# Patient Record
Sex: FEMALE | Race: WHITE | Employment: OTHER | URBAN - METROPOLITAN AREA
[De-identification: names, ages, dates, MRNs, and addresses within clinical notes are randomized per-mention and may not be internally consistent; named-entity substitution may affect disease eponyms.]

---

## 2017-06-08 PROBLEM — R29.898 WEAKNESS OF RIGHT ARM: Status: ACTIVE | Noted: 2017-06-08

## 2017-06-08 PROBLEM — M79.601 PAIN, ARM, RIGHT: Status: ACTIVE | Noted: 2017-06-08

## 2017-06-08 PROBLEM — L81.9 DISCOLORATION OF SKIN OF HAND: Status: ACTIVE | Noted: 2017-06-08

## 2018-03-11 ENCOUNTER — HOSPITAL ENCOUNTER (EMERGENCY)
Age: 43
Discharge: HOME | End: 2018-03-11
Attending: FAMILY MEDICINE
Payer: MEDICAID

## 2018-03-11 VITALS
BODY MASS INDEX: 20.72 KG/M2 | OXYGEN SATURATION: 98 % | RESPIRATION RATE: 18 BRPM | HEART RATE: 79 BPM | WEIGHT: 132 LBS | HEIGHT: 67 IN | TEMPERATURE: 98.2 F | DIASTOLIC BLOOD PRESSURE: 69 MMHG | SYSTOLIC BLOOD PRESSURE: 111 MMHG

## 2018-03-11 DIAGNOSIS — K52.9 GASTROENTERITIS: Primary | ICD-10-CM

## 2018-03-11 PROCEDURE — 99283 EMERGENCY DEPT VISIT LOW MDM: CPT

## 2018-03-11 PROCEDURE — 6360000002 HC RX W HCPCS: Performed by: FAMILY MEDICINE

## 2018-03-11 RX ORDER — ONDANSETRON 4 MG/1
4 TABLET, ORALLY DISINTEGRATING ORAL ONCE
Status: COMPLETED | OUTPATIENT
Start: 2018-03-11 | End: 2018-03-11

## 2018-03-11 RX ORDER — ONDANSETRON 4 MG/1
4 TABLET, ORALLY DISINTEGRATING ORAL EVERY 8 HOURS PRN
Qty: 5 TABLET | Refills: 0 | Status: SHIPPED | OUTPATIENT
Start: 2018-03-11 | End: 2018-04-11 | Stop reason: SDUPTHER

## 2018-03-11 RX ADMIN — ONDANSETRON 4 MG: 4 TABLET, ORALLY DISINTEGRATING ORAL at 13:18

## 2018-03-11 ASSESSMENT — PAIN DESCRIPTION - DESCRIPTORS: DESCRIPTORS: ACHING

## 2018-04-11 ENCOUNTER — HOSPITAL ENCOUNTER (EMERGENCY)
Age: 43
Discharge: HOME | End: 2018-04-11
Attending: EMERGENCY MEDICINE
Payer: MEDICAID

## 2018-04-11 ENCOUNTER — APPOINTMENT (OUTPATIENT)
Dept: GENERAL RADIOLOGY | Age: 43
End: 2018-04-11
Payer: MEDICAID

## 2018-04-11 VITALS
RESPIRATION RATE: 14 BRPM | HEIGHT: 67 IN | DIASTOLIC BLOOD PRESSURE: 72 MMHG | BODY MASS INDEX: 20.56 KG/M2 | HEART RATE: 70 BPM | TEMPERATURE: 98.4 F | WEIGHT: 131 LBS | SYSTOLIC BLOOD PRESSURE: 112 MMHG | OXYGEN SATURATION: 100 %

## 2018-04-11 DIAGNOSIS — R07.9 CHEST PAIN, UNSPECIFIED TYPE: ICD-10-CM

## 2018-04-11 DIAGNOSIS — R19.7 NAUSEA VOMITING AND DIARRHEA: Primary | ICD-10-CM

## 2018-04-11 DIAGNOSIS — R11.2 NAUSEA VOMITING AND DIARRHEA: Primary | ICD-10-CM

## 2018-04-11 LAB
ALBUMIN SERPL-MCNC: 4.7 G/DL (ref 3.5–5.2)
ALP BLD-CCNC: 50 U/L (ref 35–104)
ALT SERPL-CCNC: 9 U/L (ref 0–32)
ANION GAP SERPL CALCULATED.3IONS-SCNC: 16 MMOL/L (ref 7–16)
AST SERPL-CCNC: 14 U/L (ref 0–31)
BACTERIA: ABNORMAL /HPF
BASOPHILS ABSOLUTE: 0.03 E9/L (ref 0–0.2)
BASOPHILS RELATIVE PERCENT: 0.4 % (ref 0–2)
BILIRUB SERPL-MCNC: 0.5 MG/DL (ref 0–1.2)
BILIRUBIN URINE: NEGATIVE
BLOOD, URINE: ABNORMAL
BUN BLDV-MCNC: 12 MG/DL (ref 6–20)
CALCIUM SERPL-MCNC: 9.7 MG/DL (ref 8.6–10.2)
CHLORIDE BLD-SCNC: 100 MMOL/L (ref 98–107)
CLARITY: CLEAR
CO2: 24 MMOL/L (ref 22–29)
COLOR: YELLOW
CREAT SERPL-MCNC: 0.7 MG/DL (ref 0.5–1)
EKG ATRIAL RATE: 76 BPM
EKG P AXIS: 66 DEGREES
EKG P-R INTERVAL: 142 MS
EKG Q-T INTERVAL: 366 MS
EKG QRS DURATION: 72 MS
EKG QTC CALCULATION (BAZETT): 411 MS
EKG R AXIS: 67 DEGREES
EKG T AXIS: 52 DEGREES
EKG VENTRICULAR RATE: 76 BPM
EOSINOPHILS ABSOLUTE: 0.02 E9/L (ref 0.05–0.5)
EOSINOPHILS RELATIVE PERCENT: 0.3 % (ref 0–6)
EPITHELIAL CELLS, UA: ABNORMAL /HPF
GFR AFRICAN AMERICAN: >60
GFR NON-AFRICAN AMERICAN: >60 ML/MIN/1.73
GLUCOSE BLD-MCNC: 106 MG/DL (ref 74–109)
GLUCOSE URINE: NEGATIVE MG/DL
HCT VFR BLD CALC: 42.3 % (ref 34–48)
HEMOGLOBIN: 14.4 G/DL (ref 11.5–15.5)
IMMATURE GRANULOCYTES #: 0.05 E9/L
IMMATURE GRANULOCYTES %: 0.6 % (ref 0–5)
KETONES, URINE: NEGATIVE MG/DL
LACTIC ACID: 0.8 MMOL/L (ref 0.5–2.2)
LEUKOCYTE ESTERASE, URINE: NEGATIVE
LIPASE: 23 U/L (ref 13–60)
LYMPHOCYTES ABSOLUTE: 1.1 E9/L (ref 1.5–4)
LYMPHOCYTES RELATIVE PERCENT: 14.3 % (ref 20–42)
MCH RBC QN AUTO: 30.8 PG (ref 26–35)
MCHC RBC AUTO-ENTMCNC: 34 % (ref 32–34.5)
MCV RBC AUTO: 90.6 FL (ref 80–99.9)
MONOCYTES ABSOLUTE: 0.51 E9/L (ref 0.1–0.95)
MONOCYTES RELATIVE PERCENT: 6.6 % (ref 2–12)
NEUTROPHILS ABSOLUTE: 6 E9/L (ref 1.8–7.3)
NEUTROPHILS RELATIVE PERCENT: 77.8 % (ref 43–80)
NITRITE, URINE: NEGATIVE
PDW BLD-RTO: 12.3 FL (ref 11.5–15)
PH UA: 6 (ref 5–9)
PLATELET # BLD: 245 E9/L (ref 130–450)
PMV BLD AUTO: 10.2 FL (ref 7–12)
POTASSIUM SERPL-SCNC: 3.6 MMOL/L (ref 3.5–5)
PREGNANCY TEST URINE, POC: NEGATIVE
PROTEIN UA: NEGATIVE MG/DL
RBC # BLD: 4.67 E12/L (ref 3.5–5.5)
RBC UA: ABNORMAL /HPF (ref 0–2)
SODIUM BLD-SCNC: 140 MMOL/L (ref 132–146)
SPECIFIC GRAVITY UA: 1.01 (ref 1–1.03)
TOTAL PROTEIN: 7.5 G/DL (ref 6.4–8.3)
TROPONIN: <0.01 NG/ML (ref 0–0.03)
UROBILINOGEN, URINE: 0.2 E.U./DL
WBC # BLD: 7.7 E9/L (ref 4.5–11.5)
WBC UA: ABNORMAL /HPF (ref 0–5)

## 2018-04-11 PROCEDURE — 2580000003 HC RX 258: Performed by: NURSE PRACTITIONER

## 2018-04-11 PROCEDURE — 81001 URINALYSIS AUTO W/SCOPE: CPT

## 2018-04-11 PROCEDURE — 80053 COMPREHEN METABOLIC PANEL: CPT

## 2018-04-11 PROCEDURE — 84484 ASSAY OF TROPONIN QUANT: CPT

## 2018-04-11 PROCEDURE — 96374 THER/PROPH/DIAG INJ IV PUSH: CPT

## 2018-04-11 PROCEDURE — 6370000000 HC RX 637 (ALT 250 FOR IP): Performed by: NURSE PRACTITIONER

## 2018-04-11 PROCEDURE — 74022 RADEX COMPL AQT ABD SERIES: CPT

## 2018-04-11 PROCEDURE — 83690 ASSAY OF LIPASE: CPT

## 2018-04-11 PROCEDURE — 6360000002 HC RX W HCPCS: Performed by: NURSE PRACTITIONER

## 2018-04-11 PROCEDURE — 36415 COLL VENOUS BLD VENIPUNCTURE: CPT

## 2018-04-11 PROCEDURE — 93005 ELECTROCARDIOGRAM TRACING: CPT | Performed by: NURSE PRACTITIONER

## 2018-04-11 PROCEDURE — 85025 COMPLETE CBC W/AUTO DIFF WBC: CPT

## 2018-04-11 PROCEDURE — 83605 ASSAY OF LACTIC ACID: CPT

## 2018-04-11 PROCEDURE — 99284 EMERGENCY DEPT VISIT MOD MDM: CPT

## 2018-04-11 RX ORDER — ONDANSETRON 4 MG/1
4 TABLET, ORALLY DISINTEGRATING ORAL EVERY 8 HOURS PRN
Qty: 10 TABLET | Refills: 0 | Status: SHIPPED | OUTPATIENT
Start: 2018-04-11 | End: 2018-05-15

## 2018-04-11 RX ORDER — 0.9 % SODIUM CHLORIDE 0.9 %
1000 INTRAVENOUS SOLUTION INTRAVENOUS ONCE
Status: COMPLETED | OUTPATIENT
Start: 2018-04-11 | End: 2018-04-11

## 2018-04-11 RX ORDER — ONDANSETRON 2 MG/ML
4 INJECTION INTRAMUSCULAR; INTRAVENOUS ONCE
Status: COMPLETED | OUTPATIENT
Start: 2018-04-11 | End: 2018-04-11

## 2018-04-11 RX ADMIN — SODIUM CHLORIDE 1000 ML: 9 INJECTION, SOLUTION INTRAVENOUS at 10:52

## 2018-04-11 RX ADMIN — ONDANSETRON 4 MG: 2 INJECTION INTRAMUSCULAR; INTRAVENOUS at 11:02

## 2018-04-11 RX ADMIN — Medication 30 ML: at 11:02

## 2018-04-11 ASSESSMENT — PAIN DESCRIPTION - PAIN TYPE: TYPE: ACUTE PAIN

## 2018-04-11 ASSESSMENT — PAIN DESCRIPTION - LOCATION: LOCATION: CHEST

## 2018-04-11 ASSESSMENT — PAIN DESCRIPTION - FREQUENCY: FREQUENCY: INTERMITTENT

## 2018-04-11 ASSESSMENT — PAIN DESCRIPTION - DESCRIPTORS: DESCRIPTORS: SHARP

## 2018-04-11 ASSESSMENT — PAIN SCALES - GENERAL: PAINLEVEL_OUTOF10: 4

## 2018-04-28 ENCOUNTER — HOSPITAL ENCOUNTER (EMERGENCY)
Age: 43
Discharge: HOME | End: 2018-04-28
Attending: EMERGENCY MEDICINE
Payer: MEDICAID

## 2018-04-28 VITALS
SYSTOLIC BLOOD PRESSURE: 142 MMHG | WEIGHT: 128 LBS | TEMPERATURE: 98.8 F | HEIGHT: 67 IN | HEART RATE: 102 BPM | BODY MASS INDEX: 20.09 KG/M2 | RESPIRATION RATE: 16 BRPM | DIASTOLIC BLOOD PRESSURE: 74 MMHG | OXYGEN SATURATION: 99 %

## 2018-04-28 DIAGNOSIS — R20.2 PARESTHESIA OF BOTH LEGS: Primary | ICD-10-CM

## 2018-04-28 PROCEDURE — 99283 EMERGENCY DEPT VISIT LOW MDM: CPT

## 2018-04-28 RX ORDER — VENLAFAXINE 75 MG/1
75 TABLET ORAL 3 TIMES DAILY
COMMUNITY
End: 2018-05-15

## 2018-04-28 RX ORDER — OMEPRAZOLE 20 MG/1
20 CAPSULE, DELAYED RELEASE ORAL DAILY
COMMUNITY
End: 2018-05-15

## 2018-04-28 ASSESSMENT — ENCOUNTER SYMPTOMS
VOMITING: 0
WHEEZING: 0
SORE THROAT: 0
SHORTNESS OF BREATH: 0
DIARRHEA: 0
ABDOMINAL DISTENTION: 0
COUGH: 0
SINUS PRESSURE: 0
ABDOMINAL PAIN: 0
CHEST TIGHTNESS: 0
NAUSEA: 0
BACK PAIN: 0

## 2018-04-28 ASSESSMENT — PAIN SCALES - GENERAL: PAINLEVEL_OUTOF10: 9

## 2018-05-05 ENCOUNTER — HOSPITAL ENCOUNTER (EMERGENCY)
Age: 43
Discharge: HOME | End: 2018-05-05
Attending: FAMILY MEDICINE
Payer: MEDICAID

## 2018-05-05 VITALS
DIASTOLIC BLOOD PRESSURE: 76 MMHG | TEMPERATURE: 98.6 F | OXYGEN SATURATION: 100 % | HEART RATE: 80 BPM | RESPIRATION RATE: 18 BRPM | SYSTOLIC BLOOD PRESSURE: 133 MMHG

## 2018-05-05 DIAGNOSIS — N30.01 ACUTE CYSTITIS WITH HEMATURIA: Primary | ICD-10-CM

## 2018-05-05 LAB
BACTERIA: ABNORMAL /HPF
BILIRUBIN URINE: NEGATIVE
BLOOD, URINE: ABNORMAL
CLARITY: CLEAR
COLOR: ABNORMAL
EPITHELIAL CELLS, UA: ABNORMAL /HPF
GLUCOSE URINE: NEGATIVE MG/DL
KETONES, URINE: NEGATIVE MG/DL
LEUKOCYTE ESTERASE, URINE: NEGATIVE
NITRITE, URINE: NEGATIVE
PH UA: 7 (ref 5–9)
PROTEIN UA: NEGATIVE MG/DL
RBC UA: ABNORMAL /HPF (ref 0–2)
SPECIFIC GRAVITY UA: 1.01 (ref 1–1.03)
UROBILINOGEN, URINE: 0.2 E.U./DL
WBC UA: ABNORMAL /HPF (ref 0–5)

## 2018-05-05 PROCEDURE — 87186 SC STD MICRODIL/AGAR DIL: CPT

## 2018-05-05 PROCEDURE — 87077 CULTURE AEROBIC IDENTIFY: CPT

## 2018-05-05 PROCEDURE — 99283 EMERGENCY DEPT VISIT LOW MDM: CPT

## 2018-05-05 PROCEDURE — 81001 URINALYSIS AUTO W/SCOPE: CPT

## 2018-05-05 PROCEDURE — 87088 URINE BACTERIA CULTURE: CPT

## 2018-05-05 RX ORDER — NITROFURANTOIN 25; 75 MG/1; MG/1
100 CAPSULE ORAL 2 TIMES DAILY
Qty: 10 CAPSULE | Refills: 0 | Status: SHIPPED | OUTPATIENT
Start: 2018-05-05 | End: 2018-05-10 | Stop reason: HOSPADM

## 2018-05-05 ASSESSMENT — ENCOUNTER SYMPTOMS
VOMITING: 0
NAUSEA: 1

## 2018-05-05 ASSESSMENT — PAIN DESCRIPTION - ORIENTATION: ORIENTATION: LOWER

## 2018-05-05 ASSESSMENT — PAIN DESCRIPTION - PAIN TYPE: TYPE: ACUTE PAIN

## 2018-05-05 ASSESSMENT — PAIN SCALES - GENERAL: PAINLEVEL_OUTOF10: 7

## 2018-05-05 ASSESSMENT — PAIN DESCRIPTION - LOCATION: LOCATION: ABDOMEN

## 2018-05-07 LAB
ORGANISM: ABNORMAL
URINE CULTURE, ROUTINE: ABNORMAL
URINE CULTURE, ROUTINE: ABNORMAL

## 2018-05-10 ENCOUNTER — HOSPITAL ENCOUNTER (EMERGENCY)
Age: 43
Discharge: HOME | End: 2018-05-10
Attending: EMERGENCY MEDICINE
Payer: MEDICAID

## 2018-05-10 VITALS
HEIGHT: 67 IN | TEMPERATURE: 98.3 F | WEIGHT: 123 LBS | HEART RATE: 93 BPM | DIASTOLIC BLOOD PRESSURE: 76 MMHG | SYSTOLIC BLOOD PRESSURE: 105 MMHG | OXYGEN SATURATION: 97 % | BODY MASS INDEX: 19.3 KG/M2 | RESPIRATION RATE: 16 BRPM

## 2018-05-10 DIAGNOSIS — R07.9 CHEST PAIN, UNSPECIFIED TYPE: Primary | ICD-10-CM

## 2018-05-10 LAB
ALBUMIN SERPL-MCNC: 4.2 G/DL (ref 3.5–5.2)
ALP BLD-CCNC: 41 U/L (ref 35–104)
ALT SERPL-CCNC: 10 U/L (ref 0–32)
ANION GAP SERPL CALCULATED.3IONS-SCNC: 13 MMOL/L (ref 7–16)
AST SERPL-CCNC: 26 U/L (ref 0–31)
BACTERIA: ABNORMAL /HPF
BASOPHILS ABSOLUTE: 0 E9/L (ref 0–0.2)
BASOPHILS RELATIVE PERCENT: 0.5 % (ref 0–2)
BILIRUB SERPL-MCNC: 0.7 MG/DL (ref 0–1.2)
BILIRUBIN URINE: NEGATIVE
BLOOD, URINE: ABNORMAL
BUN BLDV-MCNC: 16 MG/DL (ref 6–20)
CALCIUM SERPL-MCNC: 9.6 MG/DL (ref 8.6–10.2)
CHLORIDE BLD-SCNC: 101 MMOL/L (ref 98–107)
CHP ED QC CHECK: YES
CLARITY: CLEAR
CO2: 22 MMOL/L (ref 22–29)
COLOR: YELLOW
CREAT SERPL-MCNC: 0.7 MG/DL (ref 0.5–1)
EKG ATRIAL RATE: 75 BPM
EKG P AXIS: 61 DEGREES
EKG P-R INTERVAL: 146 MS
EKG Q-T INTERVAL: 366 MS
EKG QRS DURATION: 74 MS
EKG QTC CALCULATION (BAZETT): 408 MS
EKG R AXIS: 60 DEGREES
EKG T AXIS: 45 DEGREES
EKG VENTRICULAR RATE: 75 BPM
EOSINOPHILS ABSOLUTE: 0.09 E9/L (ref 0.05–0.5)
EOSINOPHILS RELATIVE PERCENT: 1.5 % (ref 0–6)
EPITHELIAL CELLS, UA: ABNORMAL /HPF
GFR AFRICAN AMERICAN: >60
GFR NON-AFRICAN AMERICAN: >60 ML/MIN/1.73
GLUCOSE BLD-MCNC: 94 MG/DL (ref 74–109)
GLUCOSE URINE: NEGATIVE MG/DL
HCG(URINE) PREGNANCY TEST: NEGATIVE
HCT VFR BLD CALC: 39.5 % (ref 34–48)
HEMOGLOBIN: 13.8 G/DL (ref 11.5–15.5)
KETONES, URINE: 15 MG/DL
LEUKOCYTE ESTERASE, URINE: NEGATIVE
LIPASE: 26 U/L (ref 13–60)
LYMPHOCYTES ABSOLUTE: 1.86 E9/L (ref 1.5–4)
LYMPHOCYTES RELATIVE PERCENT: 31.2 % (ref 20–42)
MCH RBC QN AUTO: 31.2 PG (ref 26–35)
MCHC RBC AUTO-ENTMCNC: 34.9 % (ref 32–34.5)
MCV RBC AUTO: 89.2 FL (ref 80–99.9)
MONOCYTES ABSOLUTE: 0.42 E9/L (ref 0.1–0.95)
MONOCYTES RELATIVE PERCENT: 7 % (ref 2–12)
MUCUS: PRESENT
NEUTROPHILS ABSOLUTE: 3.6 E9/L (ref 1.8–7.3)
NEUTROPHILS RELATIVE PERCENT: 60.3 % (ref 43–80)
NITRITE, URINE: NEGATIVE
OVALOCYTES: ABNORMAL
PDW BLD-RTO: 12 FL (ref 11.5–15)
PH UA: 6 (ref 5–9)
PLATELET # BLD: 269 E9/L (ref 130–450)
PMV BLD AUTO: 10.8 FL (ref 7–12)
POIKILOCYTES: ABNORMAL
POTASSIUM SERPL-SCNC: 4.9 MMOL/L (ref 3.5–5)
PREGNANCY TEST URINE, POC: NORMAL
PROTEIN UA: NEGATIVE MG/DL
RBC # BLD: 4.43 E12/L (ref 3.5–5.5)
RBC UA: ABNORMAL /HPF (ref 0–2)
SODIUM BLD-SCNC: 136 MMOL/L (ref 132–146)
SPECIFIC GRAVITY UA: 1.02 (ref 1–1.03)
TOTAL PROTEIN: 7.2 G/DL (ref 6.4–8.3)
TROPONIN: <0.01 NG/ML (ref 0–0.03)
UROBILINOGEN, URINE: 0.2 E.U./DL
WBC # BLD: 6 E9/L (ref 4.5–11.5)
WBC UA: ABNORMAL /HPF (ref 0–5)

## 2018-05-10 PROCEDURE — 81001 URINALYSIS AUTO W/SCOPE: CPT

## 2018-05-10 PROCEDURE — 83690 ASSAY OF LIPASE: CPT

## 2018-05-10 PROCEDURE — 85025 COMPLETE CBC W/AUTO DIFF WBC: CPT

## 2018-05-10 PROCEDURE — 84484 ASSAY OF TROPONIN QUANT: CPT

## 2018-05-10 PROCEDURE — 81025 URINE PREGNANCY TEST: CPT

## 2018-05-10 PROCEDURE — 80053 COMPREHEN METABOLIC PANEL: CPT

## 2018-05-10 PROCEDURE — 36415 COLL VENOUS BLD VENIPUNCTURE: CPT

## 2018-05-10 PROCEDURE — 99285 EMERGENCY DEPT VISIT HI MDM: CPT

## 2018-05-10 RX ORDER — ONDANSETRON 2 MG/ML
4 INJECTION INTRAMUSCULAR; INTRAVENOUS ONCE
Status: DISCONTINUED | OUTPATIENT
Start: 2018-05-10 | End: 2018-05-10

## 2018-05-10 ASSESSMENT — ENCOUNTER SYMPTOMS
BLOOD IN STOOL: 0
BACK PAIN: 0
COUGH: 0
SORE THROAT: 0
CHOKING: 0
SHORTNESS OF BREATH: 0
ABDOMINAL PAIN: 0
CHEST TIGHTNESS: 0
CONSTIPATION: 0
WHEEZING: 0
VOMITING: 1
COLOR CHANGE: 0
STRIDOR: 0
DIARRHEA: 0
NAUSEA: 1

## 2018-05-10 ASSESSMENT — PAIN DESCRIPTION - LOCATION: LOCATION: CHEST

## 2018-05-10 ASSESSMENT — PAIN SCALES - GENERAL: PAINLEVEL_OUTOF10: 6

## 2018-05-10 ASSESSMENT — PAIN DESCRIPTION - ORIENTATION: ORIENTATION: LEFT

## 2018-05-15 ENCOUNTER — HOSPITAL ENCOUNTER (EMERGENCY)
Age: 43
Discharge: HOME | End: 2018-05-15
Payer: MEDICAID

## 2018-05-15 VITALS
OXYGEN SATURATION: 100 % | TEMPERATURE: 98.4 F | WEIGHT: 122 LBS | HEIGHT: 67 IN | RESPIRATION RATE: 18 BRPM | DIASTOLIC BLOOD PRESSURE: 82 MMHG | SYSTOLIC BLOOD PRESSURE: 126 MMHG | BODY MASS INDEX: 19.15 KG/M2 | HEART RATE: 80 BPM

## 2018-05-15 DIAGNOSIS — J02.9 PHARYNGITIS, UNSPECIFIED ETIOLOGY: Primary | ICD-10-CM

## 2018-05-15 PROCEDURE — 99282 EMERGENCY DEPT VISIT SF MDM: CPT

## 2018-05-15 ASSESSMENT — PAIN DESCRIPTION - PAIN TYPE: TYPE: ACUTE PAIN

## 2018-05-15 ASSESSMENT — PAIN SCALES - GENERAL: PAINLEVEL_OUTOF10: 7

## 2018-05-15 ASSESSMENT — PAIN DESCRIPTION - LOCATION: LOCATION: THROAT

## 2018-05-15 ASSESSMENT — PAIN DESCRIPTION - DESCRIPTORS: DESCRIPTORS: SORE

## 2018-05-30 ENCOUNTER — OFFICE VISIT (OUTPATIENT)
Dept: VASCULAR SURGERY | Age: 43
End: 2018-05-30
Payer: MEDICAID

## 2018-05-30 DIAGNOSIS — M79.601 PAIN, ARM, RIGHT: Primary | ICD-10-CM

## 2018-05-30 DIAGNOSIS — I65.21 CAROTID STENOSIS, RIGHT: ICD-10-CM

## 2018-05-30 PROCEDURE — 4004F PT TOBACCO SCREEN RCVD TLK: CPT | Performed by: SURGERY

## 2018-05-30 PROCEDURE — G8599 NO ASA/ANTIPLAT THER USE RNG: HCPCS | Performed by: SURGERY

## 2018-05-30 PROCEDURE — 99213 OFFICE O/P EST LOW 20 MIN: CPT | Performed by: SURGERY

## 2018-05-30 PROCEDURE — G8427 DOCREV CUR MEDS BY ELIG CLIN: HCPCS | Performed by: SURGERY

## 2018-05-30 PROCEDURE — G8420 CALC BMI NORM PARAMETERS: HCPCS | Performed by: SURGERY

## 2018-06-07 ENCOUNTER — HOSPITAL ENCOUNTER (OUTPATIENT)
Dept: CARDIOLOGY | Age: 43
Discharge: HOME | End: 2018-06-07
Payer: MEDICAID

## 2018-06-07 DIAGNOSIS — I65.21 CAROTID STENOSIS, RIGHT: ICD-10-CM

## 2018-06-07 PROCEDURE — 93880 EXTRACRANIAL BILAT STUDY: CPT

## 2018-06-11 ENCOUNTER — TELEPHONE (OUTPATIENT)
Dept: VASCULAR SURGERY | Age: 43
End: 2018-06-11

## 2018-06-12 ENCOUNTER — TELEPHONE (OUTPATIENT)
Dept: VASCULAR SURGERY | Age: 43
End: 2018-06-12

## 2018-06-21 ENCOUNTER — APPOINTMENT (OUTPATIENT)
Dept: GENERAL RADIOLOGY | Age: 43
End: 2018-06-21
Payer: MEDICAID

## 2018-06-21 ENCOUNTER — HOSPITAL ENCOUNTER (OUTPATIENT)
Age: 43
Setting detail: OBSERVATION
Discharge: HOME | End: 2018-06-22
Attending: EMERGENCY MEDICINE | Admitting: INTERNAL MEDICINE
Payer: MEDICAID

## 2018-06-21 DIAGNOSIS — R07.9 CHEST PAIN, UNSPECIFIED TYPE: Primary | ICD-10-CM

## 2018-06-21 LAB
ALBUMIN SERPL-MCNC: 4.5 G/DL (ref 3.5–5.2)
ALP BLD-CCNC: 49 U/L (ref 35–104)
ALT SERPL-CCNC: 8 U/L (ref 0–32)
ANION GAP SERPL CALCULATED.3IONS-SCNC: 13 MMOL/L (ref 7–16)
AST SERPL-CCNC: 12 U/L (ref 0–31)
BASOPHILS ABSOLUTE: 0.04 E9/L (ref 0–0.2)
BASOPHILS RELATIVE PERCENT: 0.5 % (ref 0–2)
BILIRUB SERPL-MCNC: 0.5 MG/DL (ref 0–1.2)
BUN BLDV-MCNC: 12 MG/DL (ref 6–20)
CALCIUM SERPL-MCNC: 9.6 MG/DL (ref 8.6–10.2)
CHLORIDE BLD-SCNC: 101 MMOL/L (ref 98–107)
CO2: 24 MMOL/L (ref 22–29)
CREAT SERPL-MCNC: 0.7 MG/DL (ref 0.5–1)
D DIMER: <200 NG/ML DDU
EKG ATRIAL RATE: 71 BPM
EKG P AXIS: 63 DEGREES
EKG P-R INTERVAL: 144 MS
EKG Q-T INTERVAL: 386 MS
EKG QRS DURATION: 74 MS
EKG QTC CALCULATION (BAZETT): 419 MS
EKG R AXIS: 69 DEGREES
EKG T AXIS: 60 DEGREES
EKG VENTRICULAR RATE: 71 BPM
EOSINOPHILS ABSOLUTE: 0.1 E9/L (ref 0.05–0.5)
EOSINOPHILS RELATIVE PERCENT: 1.2 % (ref 0–6)
GFR AFRICAN AMERICAN: >60
GFR NON-AFRICAN AMERICAN: >60 ML/MIN/1.73
GLUCOSE BLD-MCNC: 94 MG/DL (ref 74–109)
HCT VFR BLD CALC: 38.9 % (ref 34–48)
HEMOGLOBIN: 12.9 G/DL (ref 11.5–15.5)
IMMATURE GRANULOCYTES #: 0.04 E9/L
IMMATURE GRANULOCYTES %: 0.5 % (ref 0–5)
LYMPHOCYTES ABSOLUTE: 2.22 E9/L (ref 1.5–4)
LYMPHOCYTES RELATIVE PERCENT: 27.7 % (ref 20–42)
MCH RBC QN AUTO: 30.1 PG (ref 26–35)
MCHC RBC AUTO-ENTMCNC: 33.2 % (ref 32–34.5)
MCV RBC AUTO: 90.9 FL (ref 80–99.9)
MONOCYTES ABSOLUTE: 0.72 E9/L (ref 0.1–0.95)
MONOCYTES RELATIVE PERCENT: 9 % (ref 2–12)
NEUTROPHILS ABSOLUTE: 4.9 E9/L (ref 1.8–7.3)
NEUTROPHILS RELATIVE PERCENT: 61.1 % (ref 43–80)
PDW BLD-RTO: 12 FL (ref 11.5–15)
PLATELET # BLD: 239 E9/L (ref 130–450)
PMV BLD AUTO: 10 FL (ref 7–12)
POTASSIUM SERPL-SCNC: 4 MMOL/L (ref 3.5–5)
RBC # BLD: 4.28 E12/L (ref 3.5–5.5)
SODIUM BLD-SCNC: 138 MMOL/L (ref 132–146)
TOTAL PROTEIN: 7.3 G/DL (ref 6.4–8.3)
TROPONIN: <0.01 NG/ML (ref 0–0.03)
WBC # BLD: 8 E9/L (ref 4.5–11.5)

## 2018-06-21 PROCEDURE — 84484 ASSAY OF TROPONIN QUANT: CPT

## 2018-06-21 PROCEDURE — 6370000000 HC RX 637 (ALT 250 FOR IP): Performed by: EMERGENCY MEDICINE

## 2018-06-21 PROCEDURE — 85025 COMPLETE CBC W/AUTO DIFF WBC: CPT

## 2018-06-21 PROCEDURE — 93005 ELECTROCARDIOGRAM TRACING: CPT | Performed by: NURSE PRACTITIONER

## 2018-06-21 PROCEDURE — 71046 X-RAY EXAM CHEST 2 VIEWS: CPT

## 2018-06-21 PROCEDURE — 99285 EMERGENCY DEPT VISIT HI MDM: CPT

## 2018-06-21 PROCEDURE — 36415 COLL VENOUS BLD VENIPUNCTURE: CPT

## 2018-06-21 PROCEDURE — 80053 COMPREHEN METABOLIC PANEL: CPT

## 2018-06-21 PROCEDURE — 85378 FIBRIN DEGRADE SEMIQUANT: CPT

## 2018-06-21 RX ORDER — ASPIRIN 81 MG/1
324 TABLET, CHEWABLE ORAL ONCE
Status: COMPLETED | OUTPATIENT
Start: 2018-06-21 | End: 2018-06-21

## 2018-06-21 RX ADMIN — ASPIRIN 81 MG CHEWABLE TABLET 324 MG: 81 TABLET CHEWABLE at 23:19

## 2018-06-21 ASSESSMENT — PAIN DESCRIPTION - LOCATION: LOCATION: CHEST

## 2018-06-21 ASSESSMENT — PAIN DESCRIPTION - FREQUENCY: FREQUENCY: INTERMITTENT

## 2018-06-21 ASSESSMENT — PAIN DESCRIPTION - DESCRIPTORS: DESCRIPTORS: SHARP

## 2018-06-21 ASSESSMENT — PAIN DESCRIPTION - ORIENTATION: ORIENTATION: LEFT

## 2018-06-21 ASSESSMENT — PAIN SCALES - GENERAL: PAINLEVEL_OUTOF10: 7

## 2018-06-21 ASSESSMENT — PAIN DESCRIPTION - PAIN TYPE: TYPE: ACUTE PAIN

## 2018-06-22 ENCOUNTER — APPOINTMENT (OUTPATIENT)
Dept: NUCLEAR MEDICINE | Age: 43
End: 2018-06-22
Payer: MEDICAID

## 2018-06-22 VITALS
HEART RATE: 81 BPM | WEIGHT: 114 LBS | HEIGHT: 67 IN | OXYGEN SATURATION: 94 % | TEMPERATURE: 99.2 F | BODY MASS INDEX: 17.89 KG/M2 | DIASTOLIC BLOOD PRESSURE: 74 MMHG | SYSTOLIC BLOOD PRESSURE: 117 MMHG | RESPIRATION RATE: 18 BRPM

## 2018-06-22 LAB
C-REACTIVE PROTEIN: <0.1 MG/DL (ref 0–0.4)
CHOLESTEROL, TOTAL: 210 MG/DL (ref 0–199)
CK MB: <1 NG/ML (ref 0–4.3)
HBA1C MFR BLD: 5.2 % (ref 4.8–5.9)
HCT VFR BLD CALC: 37.4 % (ref 34–48)
HDLC SERPL-MCNC: 42 MG/DL
HEMOGLOBIN: 12.5 G/DL (ref 11.5–15.5)
LDL CHOLESTEROL CALCULATED: 152 MG/DL (ref 0–99)
LV EF: 55 %
LV EF: 64 %
LVEF MODALITY: NORMAL
LVEF MODALITY: NORMAL
MAGNESIUM: 2 MG/DL (ref 1.6–2.6)
MCH RBC QN AUTO: 30.6 PG (ref 26–35)
MCHC RBC AUTO-ENTMCNC: 33.4 % (ref 32–34.5)
MCV RBC AUTO: 91.7 FL (ref 80–99.9)
PDW BLD-RTO: 12.1 FL (ref 11.5–15)
PHOSPHORUS: 3.9 MG/DL (ref 2.5–4.5)
PLATELET # BLD: 222 E9/L (ref 130–450)
PMV BLD AUTO: 10.3 FL (ref 7–12)
PRO-BNP: 8 PG/ML (ref 0–125)
RBC # BLD: 4.08 E12/L (ref 3.5–5.5)
TRIGL SERPL-MCNC: 81 MG/DL (ref 0–149)
TROPONIN: <0.01 NG/ML (ref 0–0.03)
TSH SERPL DL<=0.05 MIU/L-ACNC: 1.7 UIU/ML (ref 0.27–4.2)
URIC ACID, SERUM: 5.1 MG/DL (ref 2.4–5.7)
VLDLC SERPL CALC-MCNC: 16 MG/DL
WBC # BLD: 5.4 E9/L (ref 4.5–11.5)

## 2018-06-22 PROCEDURE — 36415 COLL VENOUS BLD VENIPUNCTURE: CPT

## 2018-06-22 PROCEDURE — 83880 ASSAY OF NATRIURETIC PEPTIDE: CPT

## 2018-06-22 PROCEDURE — 83735 ASSAY OF MAGNESIUM: CPT

## 2018-06-22 PROCEDURE — 78452 HT MUSCLE IMAGE SPECT MULT: CPT

## 2018-06-22 PROCEDURE — 80061 LIPID PANEL: CPT

## 2018-06-22 PROCEDURE — G0378 HOSPITAL OBSERVATION PER HR: HCPCS

## 2018-06-22 PROCEDURE — 2580000003 HC RX 258: Performed by: INTERNAL MEDICINE

## 2018-06-22 PROCEDURE — 6360000002 HC RX W HCPCS: Performed by: INTERNAL MEDICINE

## 2018-06-22 PROCEDURE — 83036 HEMOGLOBIN GLYCOSYLATED A1C: CPT

## 2018-06-22 PROCEDURE — 3430000000 HC RX DIAGNOSTIC RADIOPHARMACEUTICAL: Performed by: RADIOLOGY

## 2018-06-22 PROCEDURE — 84443 ASSAY THYROID STIM HORMONE: CPT

## 2018-06-22 PROCEDURE — A9500 TC99M SESTAMIBI: HCPCS | Performed by: RADIOLOGY

## 2018-06-22 PROCEDURE — 84484 ASSAY OF TROPONIN QUANT: CPT

## 2018-06-22 PROCEDURE — 86140 C-REACTIVE PROTEIN: CPT

## 2018-06-22 PROCEDURE — 93306 TTE W/DOPPLER COMPLETE: CPT

## 2018-06-22 PROCEDURE — 84550 ASSAY OF BLOOD/URIC ACID: CPT

## 2018-06-22 PROCEDURE — 82553 CREATINE MB FRACTION: CPT

## 2018-06-22 PROCEDURE — 84100 ASSAY OF PHOSPHORUS: CPT

## 2018-06-22 PROCEDURE — 93017 CV STRESS TEST TRACING ONLY: CPT

## 2018-06-22 PROCEDURE — 85027 COMPLETE CBC AUTOMATED: CPT

## 2018-06-22 RX ORDER — SODIUM CHLORIDE 0.9 % (FLUSH) 0.9 %
10 SYRINGE (ML) INJECTION PRN
Status: DISCONTINUED | OUTPATIENT
Start: 2018-06-22 | End: 2018-06-22 | Stop reason: HOSPADM

## 2018-06-22 RX ORDER — ACETAMINOPHEN 325 MG/1
650 TABLET ORAL EVERY 4 HOURS PRN
Status: DISCONTINUED | OUTPATIENT
Start: 2018-06-22 | End: 2018-06-22 | Stop reason: HOSPADM

## 2018-06-22 RX ORDER — SODIUM CHLORIDE 0.9 % (FLUSH) 0.9 %
10 SYRINGE (ML) INJECTION EVERY 12 HOURS SCHEDULED
Status: DISCONTINUED | OUTPATIENT
Start: 2018-06-22 | End: 2018-06-22 | Stop reason: HOSPADM

## 2018-06-22 RX ORDER — NITROGLYCERIN 0.4 MG/1
0.4 TABLET SUBLINGUAL EVERY 5 MIN PRN
Status: DISCONTINUED | OUTPATIENT
Start: 2018-06-22 | End: 2018-06-22 | Stop reason: HOSPADM

## 2018-06-22 RX ADMIN — Medication 11 MILLICURIE: at 12:07

## 2018-06-22 RX ADMIN — REGADENOSON 0.4 MG: 0.08 INJECTION, SOLUTION INTRAVENOUS at 15:01

## 2018-06-22 RX ADMIN — Medication 10 ML: at 10:06

## 2018-06-22 RX ADMIN — Medication 32 MILLICURIE: at 15:00

## 2018-06-22 ASSESSMENT — PAIN SCALES - GENERAL
PAINLEVEL_OUTOF10: 0
PAINLEVEL_OUTOF10: 0

## 2018-12-19 ENCOUNTER — HOSPITAL ENCOUNTER (EMERGENCY)
Age: 43
Discharge: HOME | End: 2018-12-19
Payer: MEDICAID

## 2018-12-19 VITALS
SYSTOLIC BLOOD PRESSURE: 119 MMHG | BODY MASS INDEX: 20 KG/M2 | WEIGHT: 132 LBS | HEIGHT: 68 IN | DIASTOLIC BLOOD PRESSURE: 76 MMHG | RESPIRATION RATE: 14 BRPM | HEART RATE: 77 BPM | OXYGEN SATURATION: 100 % | TEMPERATURE: 97 F

## 2018-12-19 DIAGNOSIS — R10.13 ABDOMINAL PAIN, EPIGASTRIC: Primary | ICD-10-CM

## 2018-12-19 LAB
ALBUMIN SERPL-MCNC: 4.5 G/DL (ref 3.5–5.2)
ALP BLD-CCNC: 44 U/L (ref 35–104)
ALT SERPL-CCNC: 11 U/L (ref 0–32)
ANION GAP SERPL CALCULATED.3IONS-SCNC: 13 MMOL/L (ref 7–16)
AST SERPL-CCNC: 16 U/L (ref 0–31)
BACTERIA: ABNORMAL /HPF
BASOPHILS ABSOLUTE: 0.04 E9/L (ref 0–0.2)
BASOPHILS RELATIVE PERCENT: 0.7 % (ref 0–2)
BILIRUB SERPL-MCNC: 0.9 MG/DL (ref 0–1.2)
BILIRUBIN URINE: NEGATIVE
BLOOD, URINE: ABNORMAL
BUN BLDV-MCNC: 16 MG/DL (ref 6–20)
CALCIUM SERPL-MCNC: 9.5 MG/DL (ref 8.6–10.2)
CHLORIDE BLD-SCNC: 101 MMOL/L (ref 98–107)
CHP ED QC CHECK: YES
CLARITY: CLEAR
CO2: 22 MMOL/L (ref 22–29)
COLOR: YELLOW
CREAT SERPL-MCNC: 0.8 MG/DL (ref 0.5–1)
EKG ATRIAL RATE: 80 BPM
EKG P AXIS: 72 DEGREES
EKG P-R INTERVAL: 138 MS
EKG Q-T INTERVAL: 358 MS
EKG QRS DURATION: 76 MS
EKG QTC CALCULATION (BAZETT): 412 MS
EKG R AXIS: 64 DEGREES
EKG T AXIS: 52 DEGREES
EKG VENTRICULAR RATE: 80 BPM
EOSINOPHILS ABSOLUTE: 0.01 E9/L (ref 0.05–0.5)
EOSINOPHILS RELATIVE PERCENT: 0.2 % (ref 0–6)
EPITHELIAL CELLS, UA: ABNORMAL /HPF
GFR AFRICAN AMERICAN: >60
GFR NON-AFRICAN AMERICAN: >60 ML/MIN/1.73
GLUCOSE BLD-MCNC: 94 MG/DL (ref 74–99)
GLUCOSE URINE: NEGATIVE MG/DL
HCT VFR BLD CALC: 40.5 % (ref 34–48)
HEMOGLOBIN: 13.5 G/DL (ref 11.5–15.5)
IMMATURE GRANULOCYTES #: 0.02 E9/L
IMMATURE GRANULOCYTES %: 0.3 % (ref 0–5)
KETONES, URINE: 15 MG/DL
LACTIC ACID: 1.2 MMOL/L (ref 0.5–2.2)
LEUKOCYTE ESTERASE, URINE: NEGATIVE
LIPASE: 27 U/L (ref 13–60)
LYMPHOCYTES ABSOLUTE: 1.23 E9/L (ref 1.5–4)
LYMPHOCYTES RELATIVE PERCENT: 20.6 % (ref 20–42)
MCH RBC QN AUTO: 29.7 PG (ref 26–35)
MCHC RBC AUTO-ENTMCNC: 33.3 % (ref 32–34.5)
MCV RBC AUTO: 89 FL (ref 80–99.9)
MONOCYTES ABSOLUTE: 0.59 E9/L (ref 0.1–0.95)
MONOCYTES RELATIVE PERCENT: 9.9 % (ref 2–12)
NEUTROPHILS ABSOLUTE: 4.08 E9/L (ref 1.8–7.3)
NEUTROPHILS RELATIVE PERCENT: 68.3 % (ref 43–80)
NITRITE, URINE: NEGATIVE
PDW BLD-RTO: 12.1 FL (ref 11.5–15)
PH UA: 5.5 (ref 5–9)
PLATELET # BLD: 238 E9/L (ref 130–450)
PMV BLD AUTO: 9.9 FL (ref 7–12)
POTASSIUM SERPL-SCNC: 3.9 MMOL/L (ref 3.5–5)
PREGNANCY TEST URINE, POC: NEGATIVE
PROTEIN UA: NEGATIVE MG/DL
RBC # BLD: 4.55 E12/L (ref 3.5–5.5)
RBC UA: ABNORMAL /HPF (ref 0–2)
SODIUM BLD-SCNC: 136 MMOL/L (ref 132–146)
SPECIFIC GRAVITY UA: 1.02 (ref 1–1.03)
TOTAL PROTEIN: 7.6 G/DL (ref 6.4–8.3)
TROPONIN: <0.01 NG/ML (ref 0–0.03)
UROBILINOGEN, URINE: 0.2 E.U./DL
WBC # BLD: 6 E9/L (ref 4.5–11.5)
WBC UA: ABNORMAL /HPF (ref 0–5)

## 2018-12-19 PROCEDURE — 83605 ASSAY OF LACTIC ACID: CPT

## 2018-12-19 PROCEDURE — 99284 EMERGENCY DEPT VISIT MOD MDM: CPT

## 2018-12-19 PROCEDURE — 93005 ELECTROCARDIOGRAM TRACING: CPT | Performed by: PHYSICIAN ASSISTANT

## 2018-12-19 PROCEDURE — 84484 ASSAY OF TROPONIN QUANT: CPT

## 2018-12-19 PROCEDURE — 81001 URINALYSIS AUTO W/SCOPE: CPT

## 2018-12-19 PROCEDURE — 6370000000 HC RX 637 (ALT 250 FOR IP): Performed by: PHYSICIAN ASSISTANT

## 2018-12-19 PROCEDURE — 80053 COMPREHEN METABOLIC PANEL: CPT

## 2018-12-19 PROCEDURE — 36415 COLL VENOUS BLD VENIPUNCTURE: CPT

## 2018-12-19 PROCEDURE — 85025 COMPLETE CBC W/AUTO DIFF WBC: CPT

## 2018-12-19 PROCEDURE — 83690 ASSAY OF LIPASE: CPT

## 2018-12-19 RX ORDER — SUCRALFATE 1 G/1
1 TABLET ORAL ONCE
Status: COMPLETED | OUTPATIENT
Start: 2018-12-19 | End: 2018-12-19

## 2018-12-19 RX ADMIN — SUCRALFATE 1 G: 1 TABLET ORAL at 18:43

## 2018-12-19 ASSESSMENT — PAIN DESCRIPTION - LOCATION: LOCATION: ABDOMEN

## 2018-12-19 ASSESSMENT — PAIN SCALES - GENERAL: PAINLEVEL_OUTOF10: 8

## 2018-12-19 ASSESSMENT — PAIN DESCRIPTION - DESCRIPTORS: DESCRIPTORS: ACHING

## 2018-12-19 ASSESSMENT — PAIN DESCRIPTION - PAIN TYPE: TYPE: ACUTE PAIN

## 2018-12-19 ASSESSMENT — PAIN DESCRIPTION - FREQUENCY: FREQUENCY: INTERMITTENT

## 2018-12-19 NOTE — ED NOTES
FIRST PROVIDER CONTACT ASSESSMENT NOTE      Department of Emergency Medicine   12/19/18  12:49 PM    Chief Complaint: Abdominal Pain (nausea, heartburn, generalized weakness, ongoing for 3 days) and Otalgia (bilat )      History of Present Illness:    Festus Figueroa is a 40 y.o. female who presents to the ED by private car for epigastric abdominal pain x3 days. States burning in nature. 8/10. +nausea. Focused Screening Exam:  Constitutional:  Alert, appears stated age and is in no distress.       *ALLERGIES*     Duloxetine; Gabapentin; Prednisone; Ranitidine; and Tramadol     ED Triage Vitals [12/19/18 1248]   BP Temp Temp src Pulse Resp SpO2 Height Weight   138/83 97 °F (36.1 °C) -- 100 16 100 % 5' 8\" (1.727 m) 132 lb (59.9 kg)        Initial Plan of Care:  Initiate Treatment-Testing, Proceed toTreatment Area When Bed Available for ED Attending/MLP to Continue Care    -----------------END OF FIRST PROVIDER CONTACT ASSESSMENT NOTE--------------  Electronically signed by SAIRA Rossi   DD: 12/19/18       SAIRA Rossi  12/19/18 5073

## 2019-06-06 ENCOUNTER — HOSPITAL ENCOUNTER (OUTPATIENT)
Age: 44
Discharge: HOME | End: 2019-06-08
Payer: MEDICAID

## 2019-06-06 LAB
ALBUMIN SERPL-MCNC: 4.4 G/DL (ref 3.5–5.2)
ALP BLD-CCNC: 50 U/L (ref 35–104)
ALT SERPL-CCNC: 12 U/L (ref 0–32)
ANION GAP SERPL CALCULATED.3IONS-SCNC: 18 MMOL/L (ref 7–16)
AST SERPL-CCNC: 21 U/L (ref 0–31)
BASOPHILS ABSOLUTE: 0.03 E9/L (ref 0–0.2)
BASOPHILS RELATIVE PERCENT: 0.6 % (ref 0–2)
BILIRUB SERPL-MCNC: 0.4 MG/DL (ref 0–1.2)
BUN BLDV-MCNC: 15 MG/DL (ref 6–20)
CALCIUM SERPL-MCNC: 10 MG/DL (ref 8.6–10.2)
CHLORIDE BLD-SCNC: 103 MMOL/L (ref 98–107)
CHOLESTEROL, TOTAL: 196 MG/DL (ref 0–199)
CO2: 22 MMOL/L (ref 22–29)
CREAT SERPL-MCNC: 0.7 MG/DL (ref 0.5–1)
EOSINOPHILS ABSOLUTE: 0.05 E9/L (ref 0.05–0.5)
EOSINOPHILS RELATIVE PERCENT: 0.9 % (ref 0–6)
GFR AFRICAN AMERICAN: >60
GFR NON-AFRICAN AMERICAN: >60 ML/MIN/1.73
GLUCOSE BLD-MCNC: 91 MG/DL (ref 74–99)
HCT VFR BLD CALC: 37.7 % (ref 34–48)
HDLC SERPL-MCNC: 57 MG/DL
HEMOGLOBIN: 12.1 G/DL (ref 11.5–15.5)
IMMATURE GRANULOCYTES #: 0.02 E9/L
IMMATURE GRANULOCYTES %: 0.4 % (ref 0–5)
LDL CHOLESTEROL CALCULATED: 121 MG/DL (ref 0–99)
LYMPHOCYTES ABSOLUTE: 1.72 E9/L (ref 1.5–4)
LYMPHOCYTES RELATIVE PERCENT: 31.6 % (ref 20–42)
MCH RBC QN AUTO: 29.4 PG (ref 26–35)
MCHC RBC AUTO-ENTMCNC: 32.1 % (ref 32–34.5)
MCV RBC AUTO: 91.5 FL (ref 80–99.9)
MONOCYTES ABSOLUTE: 0.76 E9/L (ref 0.1–0.95)
MONOCYTES RELATIVE PERCENT: 14 % (ref 2–12)
NEUTROPHILS ABSOLUTE: 2.86 E9/L (ref 1.8–7.3)
NEUTROPHILS RELATIVE PERCENT: 52.5 % (ref 43–80)
PDW BLD-RTO: 12.9 FL (ref 11.5–15)
PLATELET # BLD: 223 E9/L (ref 130–450)
PMV BLD AUTO: 10.8 FL (ref 7–12)
POTASSIUM SERPL-SCNC: 4.1 MMOL/L (ref 3.5–5)
RBC # BLD: 4.12 E12/L (ref 3.5–5.5)
SODIUM BLD-SCNC: 143 MMOL/L (ref 132–146)
TOTAL PROTEIN: 7 G/DL (ref 6.4–8.3)
TRIGL SERPL-MCNC: 90 MG/DL (ref 0–149)
TSH SERPL DL<=0.05 MIU/L-ACNC: 1.67 UIU/ML (ref 0.27–4.2)
VITAMIN D 25-HYDROXY: 22 NG/ML (ref 30–100)
VLDLC SERPL CALC-MCNC: 18 MG/DL
WBC # BLD: 5.4 E9/L (ref 4.5–11.5)

## 2019-06-06 PROCEDURE — 85025 COMPLETE CBC W/AUTO DIFF WBC: CPT

## 2019-06-06 PROCEDURE — 80053 COMPREHEN METABOLIC PANEL: CPT

## 2019-06-06 PROCEDURE — 84443 ASSAY THYROID STIM HORMONE: CPT

## 2019-06-06 PROCEDURE — 80061 LIPID PANEL: CPT

## 2019-06-06 PROCEDURE — 82306 VITAMIN D 25 HYDROXY: CPT

## 2019-07-01 ENCOUNTER — HOSPITAL ENCOUNTER (OUTPATIENT)
Dept: NEUROLOGY | Age: 44
Discharge: HOME | End: 2019-07-01
Payer: MEDICAID

## 2019-07-01 PROCEDURE — 95911 NRV CNDJ TEST 9-10 STUDIES: CPT

## 2019-07-01 PROCEDURE — 95886 MUSC TEST DONE W/N TEST COMP: CPT

## 2019-07-01 NOTE — LETTER
RE:  Paris Jo    Dear Dr. Ronaldo Cespedes,     Enclosed you will find a copy of the requested EMG on your patient, Paris Jo completed 7/1/2019. EMG Findings:   Kelvin Juarez MD   Physician   Internal Medicine   Procedures   Signed   Date of Service:  7/1/2019  3:26 PM            Procedure Orders   EMG [[id number]] ordered by Kelvin Juarez MD at 54/89/54 1526   Procedures   EMG [NEU5]          Signed                 Show:Clear all  [x]Manual[x]Template[x]Copied    Added by:  Vandana Gracia MD      []Panfilo for details      EMG [address] [address]  Electrodiagnostic Laboratory   [facility]          Full Name:    Paris Jo                 Gender:             Female  MRN:             [id number]                         YOB: 1975  Location[de-identified]     [facility] (02)      Visit Date:                          7/1/2019 12:42  Age:                                   44 Years 10 Months Old  Examining Physician:      Dr. Yuri Bautista   Referring Physician:        Dr. Darlin Guo  Technician:                       Kimberlee Noble   Height:                               5 feet 7 inch  Weight:                              142 lbs  Notes:                                Neuropathy of bilateral lowers      Motor NCS      Nerve / Sites Lat. Lat Diff Amplitude Amp. 1-2 Distance Velocity Temp.     ms ms mV % cm m/s °C   R Peroneal - EDB      Ankle 3.49   7.9 100 8   31.3      Pop fossa 11.67 8.18 7.2 91.3 41 50 31.2   L Peroneal - EDB      Ankle 3.59   6.5 100 8   31      Pop fossa 12.03 8.44 6.1 93.9 42 50 31   R Tibial - AH      Ankle 3.28   7.5 100 8   31.5      Pop fossa 11.77 8.49 6.0 79.8 45 53 31.5   L Tibial - AH      Ankle 2.34   6.8 100 8   31.1      Pop fossa 12.19 9.84 6.4 94.3 45 46 31.1       Sensory NCS      Nerve / Sites Onset Lat Peak Lat PP Amp Distance Velocity Temp.     ms ms µV cm m/s °C   R Superficial peroneal - Ankle      Lat leg 1.67 2.60 13.8 10 60 31.1 tibial nerves bilaterally. H reflexes normal..     Insertional activity in the lower extremity revealed no evidence of positive waves or fibrillation potentials. Please refer to the above chart for specifics. .     Impression:     #1 normal nerve conduction studies of the peroneal, tibial, superficial peroneal, and sural nerves bilaterally.     #2 no diagnostic evidence of a lumbar- sacral motor radiculopathy. Pure sensory radiculopathies cannot be detected by electrodiagnostic technique. Clinical correlation recommended.     #3 no evidence of lower extremity entrapment syndromes     #4 no evidence of primary myopathy.     Thank you     Electronically signed by Maame Tracy MD on 2/9/0807 at 3:26 PM                         History:  Lisa complains of \"swelling on the top of my foot and at the back of my leg\". Symptoms began in the right foot in 2018. Left lower extremity is weak however without pain or numbness or tingling. Tingling is intermittent and involves all the toes of the right foot. She does complain of lower back pain. It is pertinent to note that she has been seen by Dr. Brady Griffin in [facility] for pain management. She is also been seen by her neurologist, Dr. Yohan Limon. She has been diagnosed as having complex regional pain syndrome in both upper and lower extremities. She also has \"fibromyalgia. .     ROS:   Please see the above history. Complains of difficulty breathing and has been told she has asthma.   Further review of systems negative    Meds:    Prior to Admission medications    Not on File       PMH:     Past Medical History:   Diagnosis Date    Asthma     Carotid stenosis, right 05/30/2018    30-40%    Chest pain 6/21/2018    Complex regional pain syndrome 2018    RIGHT UPPER EXT; [place]    Discoloration of skin of hand 6/8/2017    Environmental allergies     Environmental allergies     Sprained ankle     Weakness of right arm 6/8/2017       PSH: Past Surgical History:   Procedure Laterality Date    COLONOSCOPY  ? Negative findings    UPPER GASTROINTESTINAL ENDOSCOPY  ? GERD    WISDOM TOOTH EXTRACTION         Social History:    Social History     Socioeconomic History    Marital status:      Spouse name: Not on file    Number of children: Not on file    Years of education: Not on file    Highest education level: Not on file   Occupational History    Not on file   Social Needs    Financial resource strain: Not on file    Food insecurity:     Worry: Not on file     Inability: Not on file    Transportation needs:     Medical: Not on file     Non-medical: Not on file   Tobacco Use    Smoking status: Current Some Day Smoker     Packs/day: 0.50     Types: Cigarettes     Start date:     Smokeless tobacco: Never Used   Substance and Sexual Activity    Alcohol use: No    Drug use: No    Sexual activity: Not on file   Lifestyle    Physical activity:     Days per week: Not on file     Minutes per session: Not on file    Stress: Not on file   Relationships    Social connections:     Talks on phone: Not on file     Gets together: Not on file     Attends Mandaeism service: Not on file     Active member of club or organization: Not on file     Attends meetings of clubs or organizations: Not on file     Relationship status: Not on file    Intimate partner violence:     Fear of current or ex partner: Not on file     Emotionally abused: Not on file     Physically abused: Not on file     Forced sexual activity: Not on file   Other Topics Concern    Not on file   Social History Narrative    Not on file       Family History:    Family History   Problem Relation Age of Onset    Heart Failure Mother     Elevated Lipids Mother     Heart Disease Father          age 77, cardiomyopathy       Exam: Feels 41-year-old female 5 foot 7 inches weighing 142 pounds. Cognitively intact and follows commands.

## 2019-09-19 ENCOUNTER — HOSPITAL ENCOUNTER (EMERGENCY)
Age: 44
Discharge: HOME | End: 2019-09-19
Payer: MEDICAID

## 2019-09-19 VITALS
WEIGHT: 140 LBS | OXYGEN SATURATION: 99 % | HEIGHT: 67 IN | RESPIRATION RATE: 18 BRPM | BODY MASS INDEX: 21.97 KG/M2 | DIASTOLIC BLOOD PRESSURE: 75 MMHG | SYSTOLIC BLOOD PRESSURE: 134 MMHG | TEMPERATURE: 98.5 F | HEART RATE: 88 BPM

## 2019-09-19 DIAGNOSIS — L03.211 CELLULITIS, FACE: Primary | ICD-10-CM

## 2019-09-19 PROCEDURE — 6370000000 HC RX 637 (ALT 250 FOR IP)

## 2019-09-19 PROCEDURE — 99282 EMERGENCY DEPT VISIT SF MDM: CPT

## 2019-09-19 RX ORDER — CLINDAMYCIN PALMITATE HYDROCHLORIDE 75 MG/5ML
300 SOLUTION ORAL 3 TIMES DAILY
Qty: 600 ML | Refills: 0 | Status: SHIPPED | OUTPATIENT
Start: 2019-09-19 | End: 2019-09-29

## 2019-09-19 RX ORDER — SUCRALFATE ORAL 1 G/10ML
1 SUSPENSION ORAL DAILY
COMMUNITY
End: 2021-02-08

## 2019-09-19 RX ORDER — CLINDAMYCIN PALMITATE HYDROCHLORIDE 75 MG/5ML
SOLUTION ORAL
Status: COMPLETED
Start: 2019-09-19 | End: 2019-09-19

## 2019-09-19 RX ORDER — AMITRIPTYLINE HYDROCHLORIDE 10 MG/1
10 TABLET, FILM COATED ORAL NIGHTLY
COMMUNITY
End: 2020-09-25

## 2019-09-19 RX ORDER — CLINDAMYCIN PALMITATE HYDROCHLORIDE 75 MG/5ML
300 SOLUTION ORAL ONCE
Status: COMPLETED | OUTPATIENT
Start: 2019-09-19 | End: 2019-09-19

## 2019-09-19 RX ADMIN — CLINDAMYCIN PALMITATE HYDROCHLORIDE 300 MG: 75 SOLUTION ORAL at 18:23

## 2020-06-11 ENCOUNTER — OFFICE VISIT (OUTPATIENT)
Dept: VASCULAR SURGERY | Age: 45
End: 2020-06-11
Payer: MEDICAID

## 2020-06-11 VITALS — HEIGHT: 67 IN | WEIGHT: 128 LBS | RESPIRATION RATE: 16 BRPM | BODY MASS INDEX: 20.09 KG/M2

## 2020-06-11 PROCEDURE — 1036F TOBACCO NON-USER: CPT | Performed by: SURGERY

## 2020-06-11 PROCEDURE — G8420 CALC BMI NORM PARAMETERS: HCPCS | Performed by: SURGERY

## 2020-06-11 PROCEDURE — G8427 DOCREV CUR MEDS BY ELIG CLIN: HCPCS | Performed by: SURGERY

## 2020-06-11 PROCEDURE — 99213 OFFICE O/P EST LOW 20 MIN: CPT | Performed by: SURGERY

## 2020-06-11 RX ORDER — FAMOTIDINE 40 MG/5ML
20 POWDER, FOR SUSPENSION ORAL 2 TIMES DAILY
COMMUNITY
End: 2021-02-08

## 2020-06-11 RX ORDER — CETIRIZINE HYDROCHLORIDE, PSEUDOEPHEDRINE HYDROCHLORIDE 5; 120 MG/1; MG/1
1 TABLET, FILM COATED, EXTENDED RELEASE ORAL 2 TIMES DAILY
COMMUNITY
End: 2020-09-25

## 2020-06-11 NOTE — PROGRESS NOTES
Laterality Date    COLONOSCOPY  ? Negative findings    UPPER GASTROINTESTINAL ENDOSCOPY  ? GERD    WISDOM TOOTH EXTRACTION         Family History   Problem Relation Age of Onset    Heart Failure Mother     Elevated Lipids Mother     Heart Disease Father          age 77, cardiomyopathy       Social History     Socioeconomic History    Marital status:      Spouse name: Not on file    Number of children: Not on file    Years of education: Not on file    Highest education level: Not on file   Occupational History    Not on file   Social Needs    Financial resource strain: Not on file    Food insecurity     Worry: Not on file     Inability: Not on file    Transportation needs     Medical: Not on file     Non-medical: Not on file   Tobacco Use    Smoking status: Former Smoker     Types: Cigarettes     Start date:      Last attempt to quit: 2017     Years since quittin.7    Smokeless tobacco: Never Used   Substance and Sexual Activity    Alcohol use: No    Drug use: No    Sexual activity: Not Currently   Lifestyle    Physical activity     Days per week: Not on file     Minutes per session: Not on file    Stress: Not on file   Relationships    Social connections     Talks on phone: Not on file     Gets together: Not on file     Attends Jewish service: Not on file     Active member of club or organization: Not on file     Attends meetings of clubs or organizations: Not on file     Relationship status: Not on file    Intimate partner violence     Fear of current or ex partner: Not on file     Emotionally abused: Not on file     Physically abused: Not on file     Forced sexual activity: Not on file   Other Topics Concern    Not on file   Social History Narrative    Not on file       Review of Systems:    Eyes:  No blurring, diplopia or vision loss. Respiratory:  No cough, pleuritic chest pain, dyspnea, or wheezing.   Cardiovascular: No angina, palpitations

## 2020-07-21 ENCOUNTER — HOSPITAL ENCOUNTER (OUTPATIENT)
Dept: CARDIOLOGY | Age: 45
Discharge: HOME | End: 2020-07-21
Payer: MEDICAID

## 2020-07-21 PROCEDURE — 93880 EXTRACRANIAL BILAT STUDY: CPT

## 2020-07-22 ENCOUNTER — TELEPHONE (OUTPATIENT)
Dept: VASCULAR SURGERY | Age: 45
End: 2020-07-22

## 2020-07-22 NOTE — TELEPHONE ENCOUNTER
Notified patient no changes in carotid ultrasound, keep appoint,emt to see Dr. Cr Egan in two years.                    ----- Message from Nazario Chakraborty MD sent at 7/22/2020  3:13 PM EDT -----  Please inform the patient, the carotid stenosis mild 30% on the right, 20% left, unchanged from 2 years ago, keep the 2-year appointment

## 2020-07-27 NOTE — PROCEDURES
[address]                  [address] [facility],  [facility]                                VASCULAR REPORT    PATIENT NAME: Ameena Blanco                    :        1975  MED REC NO:   [id number]                            ROOM:  ACCOUNT NO:   [de-identified]                           ADMIT DATE: 2020  PROVIDER:     Yazmin Adler MD    DATE OF PROCEDURE:  2020    CAROTID ULTRASOUND REPORT    INDICATION:  Carotid bruit with mild right carotid stenosis, ultrasound  done two years ago. FINDINGS:  Duplex scanning of the right carotid artery revealed the  patient has moderate intimal thickening with a peak internal carotid  velocity of 596, diastolic velocity of 44 cm/sec with the plaque causing  30% stenosis. Duplex scanning of the left carotid artery revealed mild intimal  thickening with peak internal carotid velocity of 95, diastolic velocity  of 33 cm/sec with widely patent left carotid. IMPRESSION:  Widely patent left carotid, associated with 30% stenosis on  the right side, unchanged from two years ago.         Jasmin Weaver MD    D: 2020 12:32:34       T: 2020 14:03:24     [email or web address]  Job#: [id number]     Doc#: [id number]    CC:  Julia Dempsey DO

## 2020-09-25 ENCOUNTER — HOSPITAL ENCOUNTER (EMERGENCY)
Age: 45
Discharge: HOME | End: 2020-09-25
Payer: MEDICAID

## 2020-09-25 VITALS
OXYGEN SATURATION: 100 % | SYSTOLIC BLOOD PRESSURE: 135 MMHG | RESPIRATION RATE: 18 BRPM | WEIGHT: 125 LBS | DIASTOLIC BLOOD PRESSURE: 66 MMHG | HEART RATE: 84 BPM | BODY MASS INDEX: 19.62 KG/M2 | TEMPERATURE: 97.5 F | HEIGHT: 67 IN

## 2020-09-25 LAB
ALBUMIN SERPL-MCNC: 4.9 G/DL (ref 3.5–5.2)
ALP BLD-CCNC: 58 U/L (ref 35–104)
ALT SERPL-CCNC: 9 U/L (ref 0–32)
AMYLASE: 51 U/L (ref 20–100)
ANION GAP SERPL CALCULATED.3IONS-SCNC: 13 MMOL/L (ref 7–16)
AST SERPL-CCNC: 22 U/L (ref 0–31)
BASOPHILS ABSOLUTE: 0.02 E9/L (ref 0–0.2)
BASOPHILS RELATIVE PERCENT: 0.3 % (ref 0–2)
BILIRUB SERPL-MCNC: 0.5 MG/DL (ref 0–1.2)
BUN BLDV-MCNC: 11 MG/DL (ref 6–20)
CALCIUM SERPL-MCNC: 9.8 MG/DL (ref 8.6–10.2)
CHLORIDE BLD-SCNC: 102 MMOL/L (ref 98–107)
CO2: 23 MMOL/L (ref 22–29)
CREAT SERPL-MCNC: 0.7 MG/DL (ref 0.5–1)
EOSINOPHILS ABSOLUTE: 0.01 E9/L (ref 0.05–0.5)
EOSINOPHILS RELATIVE PERCENT: 0.2 % (ref 0–6)
GFR AFRICAN AMERICAN: >60
GFR NON-AFRICAN AMERICAN: >60 ML/MIN/1.73
GLUCOSE BLD-MCNC: 96 MG/DL (ref 74–99)
HCT VFR BLD CALC: 40.9 % (ref 34–48)
HEMOGLOBIN: 12.8 G/DL (ref 11.5–15.5)
IMMATURE GRANULOCYTES #: 0.01 E9/L
IMMATURE GRANULOCYTES %: 0.2 % (ref 0–5)
LYMPHOCYTES ABSOLUTE: 1.04 E9/L (ref 1.5–4)
LYMPHOCYTES RELATIVE PERCENT: 17.6 % (ref 20–42)
MCH RBC QN AUTO: 28.4 PG (ref 26–35)
MCHC RBC AUTO-ENTMCNC: 31.3 % (ref 32–34.5)
MCV RBC AUTO: 90.7 FL (ref 80–99.9)
MONOCYTES ABSOLUTE: 0.34 E9/L (ref 0.1–0.95)
MONOCYTES RELATIVE PERCENT: 5.8 % (ref 2–12)
NEUTROPHILS ABSOLUTE: 4.48 E9/L (ref 1.8–7.3)
NEUTROPHILS RELATIVE PERCENT: 75.9 % (ref 43–80)
PDW BLD-RTO: 13.2 FL (ref 11.5–15)
PLATELET # BLD: 288 E9/L (ref 130–450)
PMV BLD AUTO: 10.4 FL (ref 7–12)
POTASSIUM REFLEX MAGNESIUM: 4.3 MMOL/L (ref 3.5–5)
RBC # BLD: 4.51 E12/L (ref 3.5–5.5)
SODIUM BLD-SCNC: 138 MMOL/L (ref 132–146)
TOTAL PROTEIN: 8.4 G/DL (ref 6.4–8.3)
WBC # BLD: 5.9 E9/L (ref 4.5–11.5)

## 2020-09-25 PROCEDURE — 6370000000 HC RX 637 (ALT 250 FOR IP): Performed by: PHYSICIAN ASSISTANT

## 2020-09-25 PROCEDURE — 85025 COMPLETE CBC W/AUTO DIFF WBC: CPT

## 2020-09-25 PROCEDURE — 99283 EMERGENCY DEPT VISIT LOW MDM: CPT

## 2020-09-25 PROCEDURE — 80053 COMPREHEN METABOLIC PANEL: CPT

## 2020-09-25 PROCEDURE — 99282 EMERGENCY DEPT VISIT SF MDM: CPT

## 2020-09-25 PROCEDURE — 82150 ASSAY OF AMYLASE: CPT

## 2020-09-25 RX ORDER — SERTRALINE HYDROCHLORIDE 20 MG/ML
20 SOLUTION ORAL NIGHTLY
COMMUNITY

## 2020-09-25 RX ORDER — METOCLOPRAMIDE 10 MG/1
10 TABLET ORAL 2 TIMES DAILY
Qty: 14 TABLET | Refills: 0 | Status: SHIPPED | OUTPATIENT
Start: 2020-09-25 | End: 2020-09-26 | Stop reason: SINTOL

## 2020-09-25 RX ORDER — METOCLOPRAMIDE 10 MG/1
10 TABLET ORAL ONCE
Status: COMPLETED | OUTPATIENT
Start: 2020-09-25 | End: 2020-09-25

## 2020-09-25 RX ADMIN — METOCLOPRAMIDE 10 MG: 10 TABLET ORAL at 12:49

## 2020-09-25 ASSESSMENT — PAIN DESCRIPTION - LOCATION: LOCATION: ABDOMEN

## 2020-09-25 ASSESSMENT — PAIN SCALES - GENERAL: PAINLEVEL_OUTOF10: 8

## 2020-09-25 ASSESSMENT — PAIN DESCRIPTION - DESCRIPTORS: DESCRIPTORS: DISCOMFORT

## 2020-09-25 ASSESSMENT — PAIN DESCRIPTION - ONSET: ONSET: ON-GOING

## 2020-09-25 ASSESSMENT — PAIN DESCRIPTION - FREQUENCY: FREQUENCY: INTERMITTENT

## 2020-09-25 ASSESSMENT — PAIN DESCRIPTION - PAIN TYPE: TYPE: ACUTE PAIN

## 2020-09-25 ASSESSMENT — PAIN - FUNCTIONAL ASSESSMENT: PAIN_FUNCTIONAL_ASSESSMENT: PREVENTS OR INTERFERES SOME ACTIVE ACTIVITIES AND ADLS

## 2020-09-25 NOTE — ED PROVIDER NOTES
[facility]       Department of Emergency Medicine   ED  Provider Note  Admit Date/RoomTime: 9/25/2020 11:50 AM  ED Room: 19/19  MRN: [id number]  Chief Complaint: Abdominal Pain (abd pain nausea for 3 days on meds for oral thrush)       History of Present Illness   Source of history provided by:  patient. History/Exam Limitations: none. Dolores Snellen is a 38 y.o. female who has a past medical history of:   Past Medical History:   Diagnosis Date    Asthma     Carotid stenosis, right 05/30/2018    30-40%    Chest pain 6/21/2018    Complex regional pain syndrome 2018    RIGHT UPPER EXT; [facility]    CRPS (complex regional pain syndrome type II)     Depression     Discoloration of skin of hand 6/8/2017    Environmental allergies     Environmental allergies     Fibromyalgia     Sprained ankle     Weakness of right arm 6/8/2017   chronic GERD w esophagitis presents to the ED with a complaint of upset stomach. Patient states for the past month she has been treating for oral thrush. States she had white spots on her tongue, sore throat and pain with swallowing. Primary diagnosed her with thrush and put her on the oral nystatin swish and spit. She did full treatment for that and then called him back stating she was still having pain and so he did a second course of the nystatin. Patient states she just finished that 2 days ago. She complains that her tongue still looks white. She is still getting reflux of acid. She has an upset stomach and nausea. States she does not eat much because of the nausea. States she has small stools, has never seen any blood. She did call her primary and and he put her on Zofran. Patient does see GI Dr. Josh Moreno. Last endoscopy was earlier this year with a diagnosis of GERD and inflammation. She states she can only take Pepcid, not a stronger stomach pill because of all her allergies and medication sensitivities. She is also on Carafate.     ROS negative guarding. Rectal:  Gu: Bladder nontender and non distended. No CVA tenderness. Pelvic:  Extremities:  Normal ROM. Nontender to palpation. Atraumatic. Back:  Normal ROM. Nontender to palpation. Neuro:  Alert and Oriented to person, place, time and situation. Normal LOC. Moves all extremities. Speech fluent. Psych:  Calm and Cooperative. Normal thought process. Normal judgement.         Lab / Imaging Results   (All laboratory and radiology results have been personally reviewed by myself)  Labs:  Results for orders placed or performed during the hospital encounter of 09/25/20   CBC Auto Differential   Result Value Ref Range    WBC 5.9 4.5 - 11.5 E9/L    RBC 4.51 3.50 - 5.50 E12/L    Hemoglobin 12.8 11.5 - 15.5 g/dL    Hematocrit 40.9 34.0 - 48.0 %    MCV 90.7 80.0 - 99.9 fL    MCH 28.4 26.0 - 35.0 pg    MCHC 31.3 (L) 32.0 - 34.5 %    RDW 13.2 11.5 - 15.0 fL    Platelets 402 753 - 064 E9/L    MPV 10.4 7.0 - 12.0 fL    Neutrophils % 75.9 43.0 - 80.0 %    Immature Granulocytes % 0.2 0.0 - 5.0 %    Lymphocytes % 17.6 (L) 20.0 - 42.0 %    Monocytes % 5.8 2.0 - 12.0 %    Eosinophils % 0.2 0.0 - 6.0 %    Basophils % 0.3 0.0 - 2.0 %    Neutrophils Absolute 4.48 1.80 - 7.30 E9/L    Immature Granulocytes # 0.01 E9/L    Lymphocytes Absolute 1.04 (L) 1.50 - 4.00 E9/L    Monocytes Absolute 0.34 0.10 - 0.95 E9/L    Eosinophils Absolute 0.01 (L) 0.05 - 0.50 E9/L    Basophils Absolute 0.02 0.00 - 0.20 E9/L   Comprehensive Metabolic Panel w/ Reflex to MG   Result Value Ref Range    Sodium 138 132 - 146 mmol/L    Potassium reflex Magnesium 4.3 3.5 - 5.0 mmol/L    Chloride 102 98 - 107 mmol/L    CO2 23 22 - 29 mmol/L    Anion Gap 13 7 - 16 mmol/L    Glucose 96 74 - 99 mg/dL    BUN 11 6 - 20 mg/dL    CREATININE 0.7 0.5 - 1.0 mg/dL    GFR Non-African American >60 >=60 mL/min/1.73    GFR African American >60     Calcium 9.8 8.6 - 10.2 mg/dL    Total Protein 8.4 (H) 6.4 - 8.3 g/dL    Alb 4.9 3.5 - 5.2 g/dL    Total Bilirubin 0.5 0.0 - 1.2 mg/dL    Alkaline Phosphatase 58 35 - 104 U/L    ALT 9 0 - 32 U/L    AST 22 0 - 31 U/L   Amylase   Result Value Ref Range    Amylase 51 20 - 100 U/L     Imaging: All Radiology results interpreted by Radiologist unless otherwise noted. No orders to display       ED Course / Medical Decision Making     Medications   metoclopramide (REGLAN) tablet 10 mg (10 mg Oral Given 9/25/20 1249)        Re-examination:  9/25/20       Time: 1345   Patient states she feels better. Her stomach feels \"mellow. \"    Consult(s):   None    Procedure(s):   none    MDM:   Normal prescribe Reglan. I advised her to take it opposite of her Pepcid and Carafate. Strongly advised her to call Dr. Elyse Pratt for further evaluation and care. Counseling: The emergency provider has spoken with the patient and discussed todays results, in addition to providing specific details for the plan of care and counseling regarding the diagnosis and prognosis. Questions are answered at this time and they are agreeable with the plan. Assessment      1. GERD with esophagitis Stable, but not controlled     Plan   Discharge to home  Patient condition is good    New Medications     New Prescriptions    METOCLOPRAMIDE (REGLAN) 10 MG TABLET    Take 1 tablet by mouth 2 times daily for 7 days     Electronically signed by SAIRA Miramontes   DD: 9/25/20  **This report was transcribed using voice recognition software. Every effort was made to ensure accuracy; however, inadvertent computerized transcription errors may be present.   END OF ED PROVIDER NOTE       Jennifer Miramontes  09/25/20 0053

## 2020-09-26 ENCOUNTER — HOSPITAL ENCOUNTER (EMERGENCY)
Age: 45
Discharge: HOME | End: 2020-09-26
Attending: EMERGENCY MEDICINE
Payer: MEDICAID

## 2020-09-26 VITALS
SYSTOLIC BLOOD PRESSURE: 130 MMHG | RESPIRATION RATE: 16 BRPM | OXYGEN SATURATION: 100 % | BODY MASS INDEX: 19.62 KG/M2 | HEART RATE: 72 BPM | HEIGHT: 67 IN | TEMPERATURE: 97.5 F | DIASTOLIC BLOOD PRESSURE: 70 MMHG | WEIGHT: 125 LBS

## 2020-09-26 PROCEDURE — 99283 EMERGENCY DEPT VISIT LOW MDM: CPT

## 2020-09-26 PROCEDURE — 6370000000 HC RX 637 (ALT 250 FOR IP): Performed by: EMERGENCY MEDICINE

## 2020-09-26 RX ORDER — DIPHENHYDRAMINE HCL 25 MG
25 TABLET ORAL ONCE
Status: COMPLETED | OUTPATIENT
Start: 2020-09-26 | End: 2020-09-26

## 2020-09-26 RX ADMIN — DIPHENHYDRAMINE HYDROCHLORIDE 25 MG: 25 TABLET ORAL at 22:58

## 2020-09-26 ASSESSMENT — PAIN DESCRIPTION - LOCATION: LOCATION: LEG

## 2020-09-26 ASSESSMENT — PAIN DESCRIPTION - DESCRIPTORS: DESCRIPTORS: DISCOMFORT

## 2020-09-26 ASSESSMENT — PAIN SCALES - GENERAL: PAINLEVEL_OUTOF10: 7

## 2020-09-26 ASSESSMENT — PAIN DESCRIPTION - ORIENTATION: ORIENTATION: LEFT;RIGHT

## 2020-09-27 NOTE — ED PROVIDER NOTES
her mother; Heart Disease in her father; Heart Failure in her mother. The patients home medications have been reviewed. Allergies: Amoxicillin-pot clavulanate; Cefdinir; Duloxetine; Gabapentin; Hydroxyquinolines; Naltrexone; Nitrofuran derivatives; Omeprazole; Prednisone; Ranitidine; Sulfamethizole; Tramadol; and Venlafaxine        ---------------------------------------------------PHYSICAL EXAM--------------------------------------    Constitutional:  Well developed, well nourished but thin, no acute distress, non-toxic appearance   Eyes:  PERRL, conjunctiva normal, EOMI  HENT:  Atraumatic, external ears normal, nose normal, oropharynx moist. Neck- normal range of motion, no nuchal rigidity  Respiratory:  No respiratory distress, normal breath sounds, no rales, no wheezing   Cardiovascular:  Normal rate, normal rhythm, no murmurs, no gallops, no rubs. Radial pulses 2+ bilaterally. GI:  Soft, nondistended, normal bowel sounds, nontender, no rebound, no guarding   :  No costovertebral angle tenderness   Musculoskeletal:  No edema, no tenderness, no deformities. Back- no tenderness  Integument:  Well hydrated, no rash. Adequate perfusion. Lymphatic:  No cervicallymphadenopathy noted   Neurologic:  Alert & oriented x 3, CN 2-12 normal, normal gait, no focal deficits noted. Psychiatric:  Speech and behavior appropriate       -------------------------------------------------- RESULTS -------------------------------------------------  I have personally reviewed all laboratory and imaging results for this patient. Results are listed below. LABS:  No results found for this visit on 09/26/20. RADIOLOGY:  Interpreted by Radiologist.  No orders to display     ------------------------- NURSING NOTES AND VITALS REVIEWED ---------------------------   The nursing notes within the ED encounter and vital signs as below have been reviewed by myself.   /70   Pulse 72   Temp 97.5 °F (36.4 °C) (Temporal) Resp 16   Ht 5' 7\" (1.702 m)   Wt 125 lb (56.7 kg)   LMP 09/25/2020   SpO2 100%   BMI 19.58 kg/m²   Oxygen Saturation Interpretation: Normal    The patients available past medical records and past encounters were reviewed. ------------------------------ ED COURSE/MEDICAL DECISION MAKING----------------------  Medications   diphenhydrAMINE (BENADRYL) tablet 25 mg (25 mg Oral Given 9/26/20 7512)           Procedures:  none      Medical Decision Making:    Likely reaction from Reglan, will give benadryl. Is a ride home. Only wants 25 mg of Benadryl but offered 50. Her reaction is not dystonic just anxiety producing at this time. She denies shortness of breath, wheezing, airway swelling or hives so likely not an allergic reaction. She was advised to no longer take the Reglan, to follow-up with her primary care physician and return if worse. She understands and agrees with this plan. She was offered a replacement for the Reglan for her GERD and she declined. Patient was explicitly instructed on specific signs and symptoms on which to return to the emergency room for. Patient was instructed to return to the ER for any new or worsening symptoms. Additional discharge instructions were given verbally. All questions were answered. Patient is comfortable and agreeable with discharge plan. Patient in no acute distress and non-toxic in appearance. This patient's ED course included: a personal history and physicial eaxmination    This patient has remained hemodynamically stable during their ED course. Counseling: The emergency provider has spoken with the patient and discussed todays results, in addition to providing specific details for the plan of care and counseling regarding the diagnosis and prognosis.   Questions are answered at this time and they are agreeable with the plan.       --------------------------------- IMPRESSION AND DISPOSITION

## 2021-02-08 ENCOUNTER — TELEPHONE (OUTPATIENT)
Dept: SURGERY | Age: 46
End: 2021-02-08

## 2021-02-08 ENCOUNTER — OFFICE VISIT (OUTPATIENT)
Dept: SURGERY | Age: 46
End: 2021-02-08
Payer: MEDICAID

## 2021-02-08 VITALS
SYSTOLIC BLOOD PRESSURE: 134 MMHG | DIASTOLIC BLOOD PRESSURE: 82 MMHG | TEMPERATURE: 98.4 F | WEIGHT: 120 LBS | BODY MASS INDEX: 18.83 KG/M2 | HEIGHT: 67 IN | HEART RATE: 92 BPM | RESPIRATION RATE: 16 BRPM

## 2021-02-08 DIAGNOSIS — K21.00 GASTROESOPHAGEAL REFLUX DISEASE WITH ESOPHAGITIS WITHOUT HEMORRHAGE: Primary | ICD-10-CM

## 2021-02-08 PROCEDURE — 99204 OFFICE O/P NEW MOD 45 MIN: CPT | Performed by: SURGERY

## 2021-02-08 PROCEDURE — G8427 DOCREV CUR MEDS BY ELIG CLIN: HCPCS | Performed by: SURGERY

## 2021-02-08 PROCEDURE — 1036F TOBACCO NON-USER: CPT | Performed by: SURGERY

## 2021-02-08 PROCEDURE — G8484 FLU IMMUNIZE NO ADMIN: HCPCS | Performed by: SURGERY

## 2021-02-08 PROCEDURE — G8420 CALC BMI NORM PARAMETERS: HCPCS | Performed by: SURGERY

## 2021-02-08 RX ORDER — LIDOCAINE HYDROCHLORIDE 20 MG/ML
SOLUTION OROPHARYNGEAL
COMMUNITY
Start: 2020-11-12 | End: 2021-03-02 | Stop reason: ALTCHOICE

## 2021-02-08 RX ORDER — ALBUTEROL SULFATE 90 UG/1
2 AEROSOL, METERED RESPIRATORY (INHALATION) EVERY 4 HOURS PRN
COMMUNITY
Start: 2021-01-14

## 2021-02-08 NOTE — PROGRESS NOTES
 Nitrofuran Derivatives     Omeprazole     Phenazopyridine     Prednisone Other (See Comments)     \"space out\"    Ranitidine Other (See Comments)     Leg Tremors    Sulfamethizole     Tramadol Nausea Only    Venlafaxine        The patient has a family history that is negative for severe cardiovascular or respiratory issues, negative for reaction to anesthesia.     Social History     Socioeconomic History    Marital status:      Spouse name: Not on file    Number of children: Not on file    Years of education: Not on file    Highest education level: Not on file   Occupational History    Not on file   Social Needs    Financial resource strain: Not on file    Food insecurity     Worry: Not on file     Inability: Not on file    Transportation needs     Medical: Not on file     Non-medical: Not on file   Tobacco Use    Smoking status: Former Smoker     Types: Cigarettes     Start date: 200     Quit date: 9/19/2017     Years since quitting: 3.3    Smokeless tobacco: Never Used   Substance and Sexual Activity    Alcohol use: No    Drug use: No    Sexual activity: Not Currently   Lifestyle    Physical activity     Days per week: Not on file     Minutes per session: Not on file    Stress: Not on file   Relationships    Social connections     Talks on phone: Not on file     Gets together: Not on file     Attends [employer] service: Not on file     Active member of club or organization: Not on file     Attends meetings of clubs or organizations: Not on file     Relationship status: Not on file    Intimate partner violence     Fear of current or ex partner: Not on file     Emotionally abused: Not on file     Physically abused: Not on file     Forced sexual activity: Not on file   Other Topics Concern    Not on file   Social History Narrative    Not on file           Review of Systems  Review of Systems -  General ROS: negative for - chills, fatigue or malaise  ENT ROS: negative for - hearing change, nasal congestion or nasal discharge  Allergy and Immunology ROS: negative for - hives, itchy/watery eyes or nasal congestion  Hematological and Lymphatic ROS: negative for - blood clots, blood transfusions, bruising or fatigue  Endocrine ROS: negative for - malaise/lethargy, mood swings, palpitations or polydipsia/polyuria  Respiratory ROS: negative for - sputum changes, stridor, tachypnea or wheezing  Cardiovascular ROS: negative for - irregular heartbeat, loss of consciousness, murmur or orthopnea  Gastrointestinal ROS: negative for - constipation, diarrhea, gas/bloating, or hematemesis, positive for heartburn and other epigastric pain  Genito-Urinary ROS: negative for -  genital discharge, genital ulcers or hematuria  Musculoskeletal ROS: negative for - gait disturbance, muscle pain or muscular weakness    Physical exam:   /82   Pulse 92   Temp 98.4 °F (36.9 °C) (Temporal)   Resp 16   Ht 5' 7\" (1.702 m)   Wt 120 lb (54.4 kg)   LMP 01/21/2021   BMI 18.79 kg/m²   General appearance:  NAD  Pyscho/social: negative for tremors and hallucinations  Head: NCAT, PERRLA, EOMI, red conjunctiva  Neck: supple, no masses  Lungs: CTAB, equal chest rise bilateral  Heart: Reg rate  Abdomen: soft, nontender, nondistended  Skin; no lesions  Gu: no cva tenderness  Extremities: extremities normal, atraumatic, no cyanosis or edema      Assessment:  41 y.o. female with severe GERD    Plan:    Will do EGD, and manometry and  see back for results      Rayna Bernardo MD  12:53 PM  2/8/2021

## 2021-02-08 NOTE — TELEPHONE ENCOUNTER
Prior Authorization Form:      DEMOGRAPHICS:                     Patient Name:  Darryl Ames  Patient :  1975            Insurance:  Payor: Juan Daniel Mcneill / Plan: Dwain Aguilar / Product Type: *No Product type* /   Insurance ID Number:    Payor/Plan Subscr  Sex Relation Sub. Ins. ID Effective Group Num   1.  77 [facility] 1975 Female Self [id number]                                   [address] [address]         DIAGNOSIS & PROCEDURE:                       Procedure/Operation: EGD and Manometry           CPT Code: 55022    Diagnosis:  GERD    ICD10 Code: K21.9    Location:  [address] [place] [address]    Surgeon:  Marlin Jones INFORMATION:                          Date: 2021    Time:               Anesthesia:                                                         Status:  Outpatient        Special Comments:         Electronically signed by Umu Domingo MA on 2021 at 1:06 PM

## 2021-02-08 NOTE — TELEPHONE ENCOUNTER
Patient provided with surgery instructions during office visit. Patient scheduled for follow up visit with Dr. Grace Chavez on 02/25/2021. Surgery scheduling form faxed and confirmation received.     Electronically signed by Jeremias Martinez MA on 2/8/2021 at 1:07 PM

## 2021-02-09 ENCOUNTER — HOSPITAL ENCOUNTER (OUTPATIENT)
Age: 46
Discharge: HOME | End: 2021-02-11
Payer: MEDICAID

## 2021-02-09 DIAGNOSIS — Z01.818 PRE-OP TESTING: ICD-10-CM

## 2021-02-09 PROCEDURE — U0003 INFECTIOUS AGENT DETECTION BY NUCLEIC ACID (DNA OR RNA); SEVERE ACUTE RESPIRATORY SYNDROME CORONAVIRUS 2 (SARS-COV-2) (CORONAVIRUS DISEASE [COVID-19]), AMPLIFIED PROBE TECHNIQUE, MAKING USE OF HIGH THROUGHPUT TECHNOLOGIES AS DESCRIBED BY CMS-2020-01-R: HCPCS

## 2021-02-10 LAB — SARS-COV-2, PCR: NOT DETECTED

## 2021-02-11 ENCOUNTER — PREP FOR PROCEDURE (OUTPATIENT)
Dept: SURGERY | Age: 46
End: 2021-02-11

## 2021-02-11 RX ORDER — SODIUM CHLORIDE, SODIUM LACTATE, POTASSIUM CHLORIDE, CALCIUM CHLORIDE 600; 310; 30; 20 MG/100ML; MG/100ML; MG/100ML; MG/100ML
INJECTION, SOLUTION INTRAVENOUS CONTINUOUS
Status: CANCELLED | OUTPATIENT
Start: 2021-02-11

## 2021-02-11 NOTE — PROGRESS NOTES
[address] [facility]                                                                                                                    PRE OP INSTRUCTIONS FOR  Kati Hernandez        Date: 2/11/2021    Date of surgery:  2/12/2021     Arrival Time: Hospital will call you between 5pm and 7pm with your final arrival time for surgery    1. Do not eat or drink anything after midnight   prior to surgery. This includes no water, chewing gum, mints or ice chips. 2. Take the following medications with a small sip of water on the morning of Surgery:  Take benadryl dos with sip water    3. Diabetics may take evening dose of insulin but none after midnight. If you feel symptomatic or low blood sugar morning of surgery drink 1-2 ounces of apple juice only. 4. Aspirin, Ibuprofen, Advil, Naproxen, Vitamin E and other Anti-inflammatory products should be stopped  before surgery  as directed by your physician. Take Tylenol only unless instructed otherwise by your surgeon. 5. Check with your Doctor regarding stopping Plavix, Coumadin, Lovenox, Eliquis, Effient, or other blood thinners. 6. Do not smoke,use illicit drugs and do not drink any alcoholic beverages 24 hours prior to surgery. 7. You may brush your teeth the morning of surgery. DO NOT SWALLOW WATER    8. You MUST make arrangements for a responsible adult to take you home after your surgery. You will not be allowed to leave alone or drive yourself home. It is strongly suggested someone stay with you the first 24 hrs. Your surgery will be cancelled if you do not have a ride home. 9. PEDIATRIC PATIENTS ONLY:  A parent/legal guardian must accompany a child scheduled for surgery and plan to stay at the hospital until the child is discharged. Please do not bring other children with you.     10. Please wear simple, loose fitting clothing to the hospital.  Do not bring valuables (money, credit cards, checkbooks, etc.) Do not wear any makeup

## 2021-02-12 ENCOUNTER — ANESTHESIA (OUTPATIENT)
Dept: ENDOSCOPY | Age: 46
End: 2021-02-12
Payer: MEDICAID

## 2021-02-12 ENCOUNTER — ANESTHESIA EVENT (OUTPATIENT)
Dept: ENDOSCOPY | Age: 46
End: 2021-02-12
Payer: MEDICAID

## 2021-02-12 ENCOUNTER — HOSPITAL ENCOUNTER (OUTPATIENT)
Age: 46
Setting detail: OUTPATIENT SURGERY
Discharge: HOME | End: 2021-02-12
Attending: INTERNAL MEDICINE | Admitting: INTERNAL MEDICINE
Payer: MEDICAID

## 2021-02-12 VITALS
HEIGHT: 67 IN | WEIGHT: 120 LBS | TEMPERATURE: 97.7 F | HEART RATE: 85 BPM | RESPIRATION RATE: 18 BRPM | SYSTOLIC BLOOD PRESSURE: 101 MMHG | DIASTOLIC BLOOD PRESSURE: 63 MMHG | BODY MASS INDEX: 18.83 KG/M2 | OXYGEN SATURATION: 100 %

## 2021-02-12 VITALS
OXYGEN SATURATION: 100 % | SYSTOLIC BLOOD PRESSURE: 89 MMHG | RESPIRATION RATE: 17 BRPM | DIASTOLIC BLOOD PRESSURE: 54 MMHG

## 2021-02-12 DIAGNOSIS — Z01.818 PRE-OP TESTING: Primary | ICD-10-CM

## 2021-02-12 LAB — HCG(URINE) PREGNANCY TEST: NEGATIVE

## 2021-02-12 PROCEDURE — 7100000011 HC PHASE II RECOVERY - ADDTL 15 MIN: Performed by: SURGERY

## 2021-02-12 PROCEDURE — 3609012400 HC EGD TRANSORAL BIOPSY SINGLE/MULTIPLE: Performed by: SURGERY

## 2021-02-12 PROCEDURE — 6360000002 HC RX W HCPCS: Performed by: NURSE ANESTHETIST, CERTIFIED REGISTERED

## 2021-02-12 PROCEDURE — 7100000010 HC PHASE II RECOVERY - FIRST 15 MIN: Performed by: SURGERY

## 2021-02-12 PROCEDURE — 2709999900 HC NON-CHARGEABLE SUPPLY: Performed by: SURGERY

## 2021-02-12 PROCEDURE — 43239 EGD BIOPSY SINGLE/MULTIPLE: CPT | Performed by: SURGERY

## 2021-02-12 PROCEDURE — 88342 IMHCHEM/IMCYTCHM 1ST ANTB: CPT

## 2021-02-12 PROCEDURE — 3700000000 HC ANESTHESIA ATTENDED CARE: Performed by: SURGERY

## 2021-02-12 PROCEDURE — 99024 POSTOP FOLLOW-UP VISIT: CPT | Performed by: SURGERY

## 2021-02-12 PROCEDURE — 6370000000 HC RX 637 (ALT 250 FOR IP): Performed by: INTERNAL MEDICINE

## 2021-02-12 PROCEDURE — 88305 TISSUE EXAM BY PATHOLOGIST: CPT

## 2021-02-12 PROCEDURE — 3700000001 HC ADD 15 MINUTES (ANESTHESIA): Performed by: SURGERY

## 2021-02-12 PROCEDURE — 2580000003 HC RX 258: Performed by: SURGERY

## 2021-02-12 PROCEDURE — 3609015500 HC GASTRIC/DUODENAL MOTILITY &/OR MANOMETRY STUDY: Performed by: INTERNAL MEDICINE

## 2021-02-12 PROCEDURE — 81025 URINE PREGNANCY TEST: CPT

## 2021-02-12 RX ORDER — PROPOFOL 10 MG/ML
INJECTION, EMULSION INTRAVENOUS PRN
Status: DISCONTINUED | OUTPATIENT
Start: 2021-02-12 | End: 2021-02-12 | Stop reason: SDUPTHER

## 2021-02-12 RX ORDER — SODIUM CHLORIDE, SODIUM LACTATE, POTASSIUM CHLORIDE, CALCIUM CHLORIDE 600; 310; 30; 20 MG/100ML; MG/100ML; MG/100ML; MG/100ML
INJECTION, SOLUTION INTRAVENOUS CONTINUOUS
Status: DISCONTINUED | OUTPATIENT
Start: 2021-02-12 | End: 2021-02-12 | Stop reason: HOSPADM

## 2021-02-12 RX ORDER — MIDAZOLAM HYDROCHLORIDE 1 MG/ML
INJECTION INTRAMUSCULAR; INTRAVENOUS PRN
Status: DISCONTINUED | OUTPATIENT
Start: 2021-02-12 | End: 2021-02-12 | Stop reason: SDUPTHER

## 2021-02-12 RX ORDER — SODIUM CHLORIDE, SODIUM LACTATE, POTASSIUM CHLORIDE, CALCIUM CHLORIDE 600; 310; 30; 20 MG/100ML; MG/100ML; MG/100ML; MG/100ML
INJECTION, SOLUTION INTRAVENOUS CONTINUOUS
Status: DISCONTINUED | OUTPATIENT
Start: 2021-02-12 | End: 2021-02-12 | Stop reason: SDUPTHER

## 2021-02-12 RX ORDER — SODIUM CHLORIDE 0.9 % (FLUSH) 0.9 %
10 SYRINGE (ML) INJECTION PRN
Status: DISCONTINUED | OUTPATIENT
Start: 2021-02-12 | End: 2021-02-12 | Stop reason: HOSPADM

## 2021-02-12 RX ORDER — LIDOCAINE HYDROCHLORIDE 20 MG/ML
JELLY TOPICAL PRN
Status: DISCONTINUED | OUTPATIENT
Start: 2021-02-12 | End: 2021-02-12 | Stop reason: ALTCHOICE

## 2021-02-12 RX ADMIN — MIDAZOLAM 2 MG: 1 INJECTION INTRAMUSCULAR; INTRAVENOUS at 09:49

## 2021-02-12 RX ADMIN — PROPOFOL 100 MG: 10 INJECTION, EMULSION INTRAVENOUS at 09:56

## 2021-02-12 RX ADMIN — SODIUM CHLORIDE, POTASSIUM CHLORIDE, SODIUM LACTATE AND CALCIUM CHLORIDE: 600; 310; 30; 20 INJECTION, SOLUTION INTRAVENOUS at 09:47

## 2021-02-12 RX ADMIN — PROPOFOL 50 MG: 10 INJECTION, EMULSION INTRAVENOUS at 09:57

## 2021-02-12 ASSESSMENT — PAIN - FUNCTIONAL ASSESSMENT: PAIN_FUNCTIONAL_ASSESSMENT: 0-10

## 2021-02-12 ASSESSMENT — PAIN SCALES - GENERAL: PAINLEVEL_OUTOF10: 0

## 2021-02-12 NOTE — H&P
General Surgery History and Physical     Patient's Name/Date of Birth: Sudheer Hinton / 1975     Date: February 12, 2021      Surgeon: Vanessa Francis M.D.     PCP: Fany Pittman DO     Chief Complaint: heartburn     HPI:   Sudheer Hinton is a 41 y.o. female who presents for evaluation of heartburn/gerd that was responsive to medical treatment but has become recalcitrant.   Has severe GERD,  Timing is constant and progressive, radiation to epigastrium, alleviated by nothing now and meds once worked and started years ago and severity is 2-7/10         Past Medical History        Past Medical History:   Diagnosis Date    Asthma      Carotid stenosis, right 05/30/2018     30-40%    Chest pain 6/21/2018    Complex regional pain syndrome 2018     RIGHT UPPER EXT; [facility]    CRPS (complex regional pain syndrome type II)      Depression      Discoloration of skin of hand 6/8/2017    Environmental allergies      Environmental allergies      Fibromyalgia      Sprained ankle      Weakness of right arm 6/8/2017            Past Surgical History         Past Surgical History:   Procedure Laterality Date    COLONOSCOPY   2013?     Negative findings    UPPER GASTROINTESTINAL ENDOSCOPY   2013?     GERD    WISDOM TOOTH EXTRACTION                Current Facility-Administered Medications          Current Outpatient Medications   Medication Sig Dispense Refill    albuterol sulfate  (90 Base) MCG/ACT inhaler          ibuprofen (ADVIL;MOTRIN) 100 MG/5ML suspension take 20 milliliters by mouth three times a day for 10 days with food or milk        lidocaine viscous hcl (XYLOCAINE) 2 % SOLN solution SWISH AND SPIT 15 MILLILITERS BY MOUTH THREE TIMES A DAY AS NEEDED FOR 10 DAYS        sertraline (ZOLOFT) 20 MG/ML concentrated solution Take 20 mg by mouth daily          No current facility-administered medications for this visit.                   Allergies   Allergen Reactions    Amoxicillin-Pot Clavulanate      Cefdinir      Duloxetine Other (See Comments)       Hands go numb    Gabapentin Other (See Comments)    Hydroxyquinolines      Naltrexone      Nitrofuran Derivatives      Omeprazole      Phenazopyridine      Prednisone Other (See Comments)       \"space out\"    Ranitidine Other (See Comments)       Leg Tremors    Sulfamethizole      Tramadol Nausea Only    Venlafaxine           The patient has a family history that is negative for severe cardiovascular or respiratory issues, negative for reaction to anesthesia.     Social History               Socioeconomic History    Marital status:        Spouse name: Not on file    Number of children: Not on file    Years of education: Not on file    Highest education level: Not on file   Occupational History    Not on file   Social Needs    Financial resource strain: Not on file    Food insecurity       Worry: Not on file       Inability: Not on file    Transportation needs       Medical: Not on file       Non-medical: Not on file   Tobacco Use    Smoking status: Former Smoker       Types: Cigarettes       Start date: 200       Quit date: 9/19/2017       Years since quitting: 3.3    Smokeless tobacco: Never Used   Substance and Sexual Activity    Alcohol use: No    Drug use: No    Sexual activity: Not Currently   Lifestyle    Physical activity       Days per week: Not on file       Minutes per session: Not on file    Stress: Not on file   Relationships    Social connections       Talks on phone: Not on file       Gets together: Not on file       Attends Denominational service: Not on file       Active member of club or organization: Not on file       Attends meetings of clubs or organizations: Not on file       Relationship status: Not on file    Intimate partner violence       Fear of current or ex partner: Not on file       Emotionally abused: Not on file       Physically abused: Not on file       Forced sexual activity: Not on file   Other Topics Concern    Not on file   Social History Narrative    Not on file                  Review of Systems  Review of Systems -  General ROS: negative for - chills, fatigue or malaise  ENT ROS: negative for - hearing change, nasal congestion or nasal discharge  Allergy and Immunology ROS: negative for - hives, itchy/watery eyes or nasal congestion  Hematological and Lymphatic ROS: negative for - blood clots, blood transfusions, bruising or fatigue  Endocrine ROS: negative for - malaise/lethargy, mood swings, palpitations or polydipsia/polyuria  Respiratory ROS: negative for - sputum changes, stridor, tachypnea or wheezing  Cardiovascular ROS: negative for - irregular heartbeat, loss of consciousness, murmur or orthopnea  Gastrointestinal ROS: negative for - constipation, diarrhea, gas/bloating, or hematemesis, positive for heartburn and other epigastric pain  Genito-Urinary ROS: negative for -  genital discharge, genital ulcers or hematuria  Musculoskeletal ROS: negative for - gait disturbance, muscle pain or muscular weakness     Physical exam:   /77   Pulse 74   Temp 98.4 °F (36.9 °C) (Infrared)   Resp 20   Ht 5' 7\" (1.702 m)   Wt 120 lb (54.4 kg)   LMP 01/21/2021   SpO2 100%   BMI 18.79 kg/m²     General appearance:  NAD  Pyscho/social: negative for tremors and hallucinations  Head: NCAT, PERRLA, EOMI, red conjunctiva  Neck: supple, no masses  Lungs: CTAB, equal chest rise bilateral  Heart: Reg rate  Abdomen: soft, nontender, nondistended  Skin; no lesions  Gu: no cva tenderness  Extremities: extremities normal, atraumatic, no cyanosis or edema        Assessment:  41 y.o. female with severe GERD     Plan:    Will do EGD, and manometry and  see back for results        Nakul Ray MD

## 2021-02-12 NOTE — ANESTHESIA PRE PROCEDURE
Department of Anesthesiology  Preprocedure Note       Name:  Lajean Gitelman   Age:  41 y.o.  :  1975                                          MRN:  [id number]         Date:  2021      Surgeon: Anitha Stearns):  Belen Brown MD    Procedure: Procedure(s):  EGD ESOPHAGOGASTRODUODENOSCOPY    Medications prior to admission:   Prior to Admission medications    Medication Sig Start Date End Date Taking? Authorizing Provider   diphenhydrAMINE HCl (BENADRYL PO) Take 25 mg by mouth 2 times daily   Yes Historical Provider, MD   Alum Hydroxide-Mag Carbonate (GAVISCON PO) Take by mouth daily as needed   Yes Historical Provider, MD   albuterol sulfate  (90 Base) MCG/ACT inhaler 2 puffs as needed  21  Yes Historical Provider, MD   ibuprofen (ADVIL;MOTRIN) 100 MG/5ML suspension take 20 milliliters by mouth three times a day for 10 days with food or milk 21  Yes Historical Provider, MD   lidocaine viscous hcl (XYLOCAINE) 2 % SOLN solution SWISH AND SPIT 15 MILLILITERS BY MOUTH THREE TIMES A DAY AS NEEDED FOR 10 DAYS 20  Yes Historical Provider, MD   sertraline (ZOLOFT) 20 MG/ML concentrated solution Take 20 mg by mouth nightly    Yes Historical Provider, MD       Current medications:    Current Facility-Administered Medications   Medication Dose Route Frequency Provider Last Rate Last Admin    lidocaine (XYLOCAINE) 2 % uro-jet    PRN Natalie Macedo, DO   5 mL at 21 0736       Allergies:     Allergies   Allergen Reactions    Amoxicillin-Pot Clavulanate      Tremors in legs    Cefdinir      Tremors in legs    Duloxetine Other (See Comments)     Hands go numb    Gabapentin Other (See Comments)    Hydroxyquinolines     Naltrexone     Nitrofuran Derivatives     Omeprazole     Phenazopyridine     Prednisone Other (See Comments)     \"space out\"    Ranitidine Other (See Comments)     Leg Tremors    Sulfamethizole     Tramadol Nausea Only    Venlafaxine        Problem List: Patient Active Problem List   Diagnosis Code    Pain, arm, right M79.601    Discoloration of skin of hand L81.9    Weakness of right arm R29.898    Complex regional pain syndrome [id number]    Carotid stenosis, right I65.21    Chest pain R07.9       Past Medical History:        Diagnosis Date    Asthma     Carotid stenosis, right 05/30/2018    30-40%    Chest pain 6/21/2018    Complex regional pain syndrome 2018    RIGHT UPPER EXT; [facility]    CRPS (complex regional pain syndrome type II)     Depression     Discoloration of skin of hand 6/8/2017    Environmental allergies     Fibromyalgia     GERD (gastroesophageal reflux disease)     Spinal stenosis     Sprained ankle     Weakness of right arm 6/8/2017       Past Surgical History:        Procedure Laterality Date    COLONOSCOPY  2013? Negative findings    UPPER GASTROINTESTINAL ENDOSCOPY  2013? GERD    WISDOM TOOTH EXTRACTION         Social History:    Social History     Tobacco Use    Smoking status: Former Smoker     Types: Cigarettes     Start date: 1990     Quit date: 9/19/2017     Years since quitting: 3.4    Smokeless tobacco: Never Used   Substance Use Topics    Alcohol use: No                                Counseling given: Not Answered      Vital Signs (Current):   Vitals:    02/11/21 1354   Weight: 120 lb (54.4 kg)   Height: 5' 7\" (1.702 m)                                              BP Readings from Last 3 Encounters:   02/08/21 134/82   09/26/20 130/70   09/25/20 135/66       NPO Status:                                                                                 BMI:   Wt Readings from Last 3 Encounters:   02/11/21 120 lb (54.4 kg)   02/08/21 120 lb (54.4 kg)   09/26/20 125 lb (56.7 kg)     Body mass index is 18.79 kg/m².     CBC:   Lab Results   Component Value Date    WBC 5.9 09/25/2020    RBC 4.51 09/25/2020    HGB 12.8 09/25/2020    HCT 40.9 09/25/2020    MCV 90.7 09/25/2020    RDW 13.2 09/25/2020 Technically fair quality study. No comparison study available. Suggest clinical correlation. Neuro/Psych:   (+) neuromuscular disease (Weakness of right arm):, psychiatric history (Depression):             ROS comment: Complex regional pain syndrome RIGHT UPPER EXT (Seen @ [facility])   Fibromyalgia  Spinal stenosis GI/Hepatic/Renal:   (+) GERD:,           Endo/Other: Negative Endo/Other ROS                    Abdominal:           Vascular:   + PVD, aortic or cerebral (Carotid stenosis, right), . Anesthesia Plan      MAC     ASA 3       Induction: intravenous. Anesthetic plan and risks discussed with patient. Plan discussed with CRNA.                   Ruba Mercer MD   2/12/2021

## 2021-02-12 NOTE — PROGRESS NOTES
After confirmation of potential allergies, a topical analgesic was used to numb the nares followed by trans-nasal insertion of a high resolution Manometry catheter. Catheter was placed at 50cm. Pressure bands of both UES and LES were observed on the contour color. Patient instructed to take a deep breath to verify placement of catheter and diaphragmatic pinch noted on inspiration. Patient was assisted to semi-supine position and catheter was stabilized with tape. Patient encouraged to relax while acclimating to catheter for several minutes. A 30 second baseline pressure was obtained to identify landmarks. A series of wet swallows with 5 cc of room temperature saline were then administered to assess esophageal motility. At the conclusion of the procedure, the catheter was removed.

## 2021-02-13 NOTE — ANESTHESIA POSTPROCEDURE EVALUATION
Department of Anesthesiology  Postprocedure Note    Patient: Carol Sheppard  MRN: [id number]  YOB: 1975  Date of evaluation: 2/12/2021  Time:  7:36 PM     Procedure Summary     Date: 02/12/21 Room / Location: [place] [address] / [facility]    Anesthesia Start: 0630 Anesthesia Stop: 0389    Procedure: EGD BIOPSY (N/A ) Diagnosis: (GERD)    Surgeons: Brtitni Kern MD Responsible Provider: Tika Robles MD    Anesthesia Type: MAC ASA Status: 3          Anesthesia Type: MAC    Ed Phase I: Ed Score: 10    Ed Phase II: De Score: 10    Last vitals: Reviewed and per EMR flowsheets.        Anesthesia Post Evaluation    Patient location during evaluation: PACU  Patient participation: complete - patient participated  Level of consciousness: awake  Airway patency: patent  Nausea & Vomiting: no nausea and no vomiting  Complications: no  Cardiovascular status: hemodynamically stable  Respiratory status: acceptable  Hydration status: stable

## 2021-02-25 ENCOUNTER — OFFICE VISIT (OUTPATIENT)
Dept: SURGERY | Age: 46
End: 2021-02-25
Payer: MEDICAID

## 2021-02-25 VITALS
SYSTOLIC BLOOD PRESSURE: 116 MMHG | WEIGHT: 120 LBS | DIASTOLIC BLOOD PRESSURE: 81 MMHG | HEART RATE: 86 BPM | OXYGEN SATURATION: 86 % | HEIGHT: 67 IN | TEMPERATURE: 98.2 F | BODY MASS INDEX: 18.83 KG/M2

## 2021-02-25 DIAGNOSIS — K44.9 HIATAL HERNIA: Primary | ICD-10-CM

## 2021-02-25 PROCEDURE — 1036F TOBACCO NON-USER: CPT | Performed by: SURGERY

## 2021-02-25 PROCEDURE — G8427 DOCREV CUR MEDS BY ELIG CLIN: HCPCS | Performed by: SURGERY

## 2021-02-25 PROCEDURE — G8484 FLU IMMUNIZE NO ADMIN: HCPCS | Performed by: SURGERY

## 2021-02-25 PROCEDURE — G8420 CALC BMI NORM PARAMETERS: HCPCS | Performed by: SURGERY

## 2021-02-25 PROCEDURE — 99214 OFFICE O/P EST MOD 30 MIN: CPT | Performed by: SURGERY

## 2021-02-25 NOTE — PROGRESS NOTES
General Surgery History and Physical    Patient's Name/Date of Birth: Giorgio Mendoza / 1975    Date: February 25, 2021     Surgeon: Cami Monge M.D.    PCP: Petr John DO     Chief Complaint: hiatal hernia    HPI:   Giorgio Mendoza is a 41 y.o. female who presents for evaluation of symptomatic hiatal hernia that was responsive to medical treatment but has become recalcitrant. Has severe GERD, EGD showed 4cm HH, Has normal manometry. Has had no other imaging. Timing is constant, radiation to epigastrium, alleviated by nothing and started years ago and severity is 2-7/10       Past Medical History:   Diagnosis Date    Asthma     Carotid stenosis, right 05/30/2018    30-40%    Chest pain 6/21/2018    Complex regional pain syndrome 2018    RIGHT UPPER EXT; [place] [address]    CRPS (complex regional pain syndrome type II)     Depression     Discoloration of skin of hand 6/8/2017    Environmental allergies     Fibromyalgia     GERD (gastroesophageal reflux disease)     Spinal stenosis     Sprained ankle     Weakness of right arm 6/8/2017       Past Surgical History:   Procedure Laterality Date    COLONOSCOPY  2013? Negative findings    ESOPHAGEAL MOTILITY STUDY N/A 2/12/2021    ESOPHAGEAL MOTILITY/MANOMETRY STUDY performed [facility] Dilia Vogel DO at [facility]  2013?     GERD    UPPER GASTROINTESTINAL ENDOSCOPY N/A 2/12/2021    EGD BIOPSY performed by Jaclyn Quiroz MD at [address] EXTRACTION         Current Outpatient Medications   Medication Sig Dispense Refill    diphenhydrAMINE HCl (BENADRYL PO) Take 25 mg by mouth 2 times daily      Alum Hydroxide-Mag Carbonate (GAVISCON PO) Take by mouth daily as needed      albuterol sulfate  (90 Base) MCG/ACT inhaler 2 puffs as needed       ibuprofen (ADVIL;MOTRIN) 100 MG/5ML suspension take 20 milliliters by mouth three times a day for 10 days with food or milk      lidocaine viscous hcl (XYLOCAINE) 2 % SOLN solution SWISH AND SPIT 15 MILLILITERS BY MOUTH THREE TIMES A DAY AS NEEDED FOR 10 DAYS      sertraline (ZOLOFT) 20 MG/ML concentrated solution Take 20 mg by mouth nightly        No current facility-administered medications for this visit. Allergies   Allergen Reactions    Amoxicillin-Pot Clavulanate      Tremors in legs    Cefdinir      Tremors in legs    Duloxetine Other (See Comments)     Hands go numb    Gabapentin Other (See Comments)    Hydroxyquinolines     Naltrexone     Nitrofuran Derivatives     Omeprazole     Phenazopyridine     Prednisone Other (See Comments)     \"space out\"    Ranitidine Other (See Comments)     Leg Tremors    Sulfamethizole     Tramadol Nausea Only    Venlafaxine        The patient has a family history that is negative for severe cardiovascular or respiratory issues, negative for reaction to anesthesia.     Social History     Socioeconomic History    Marital status:      Spouse name: Not on file    Number of children: Not on file    Years of education: Not on file    Highest education level: Not on file   Occupational History    Not on file   Social Needs    Financial resource strain: Not on file    Food insecurity     Worry: Not on file     Inability: Not on file    Transportation needs     Medical: Not on file     Non-medical: Not on file   Tobacco Use    Smoking status: Former Smoker     Types: Cigarettes     Start date: 200     Quit date: 9/19/2017     Years since quitting: 3.4    Smokeless tobacco: Never Used   Substance and Sexual Activity    Alcohol use: No    Drug use: No    Sexual activity: Not Currently   Lifestyle    Physical activity     Days per week: Not on file     Minutes per session: Not on file    Stress: Not on file   Relationships    Social connections     Talks on phone: Not on file     Gets together: Not on file     Attends Baptism service: Not on file     Active member of club or organization: Not on file     Attends meetings of clubs or organizations: Not on file     Relationship status: Not on file    Intimate partner violence     Fear of current or ex partner: Not on file     Emotionally abused: Not on file     Physically abused: Not on file     Forced sexual activity: Not on file   Other Topics Concern    Not on file   Social History Narrative    Not on file           Review of Systems  Review of Systems -  General ROS: negative for - chills, fatigue or malaise  ENT ROS: negative for - hearing change, nasal congestion or nasal discharge  Allergy and Immunology ROS: negative for - hives, itchy/watery eyes or nasal congestion  Hematological and Lymphatic ROS: negative for - blood clots, blood transfusions, bruising or fatigue  Endocrine ROS: negative for - malaise/lethargy, mood swings, palpitations or polydipsia/polyuria  Respiratory ROS: negative for - sputum changes, stridor, tachypnea or wheezing  Cardiovascular ROS: negative for - irregular heartbeat, loss of consciousness, murmur or orthopnea  Gastrointestinal ROS: negative for - constipation, diarrhea, gas/bloating, or hematemesis, positive for heartburn and other epigastric pain  Genito-Urinary ROS: negative for -  genital discharge, genital ulcers or hematuria  Musculoskeletal ROS: negative for - gait disturbance, muscle pain or muscular weakness    Physical exam:   /81   Pulse 86   Temp 98.2 °F (36.8 °C) (Temporal)   Ht 5' 7\" (1.702 m)   Wt 120 lb (54.4 kg)   SpO2 (!) 86%   BMI 18.79 kg/m²   General appearance:  NAD  Pyscho/social: negative for tremors and hallucinations  Head: NCAT, PERRLA, EOMI, red conjunctiva  Neck: supple, no masses  Lungs: CTAB, equal chest rise bilateral  Heart: Reg rate  Abdomen: soft, nontender, nondistended  Skin; no lesions  Gu: no cva tenderness  Extremities: extremities normal, atraumatic, no cyanosis or edema      Assessment:  41 y.o. female with  symptomatic hiatal hernia    Plan:   TO OR for HHR with possible LINX  Discussed the risk, benefits and alternatives of surgery including wound infections, bleeding, scar and hernia formation and the risks of general anesthetic including MI, CVA, sudden death or reactions to anesthetic medications. The patient understands the risks and alternatives and the possibility of converting to an open procedure. All questions were answered to the patient's satisfaction and they freely signed the consent.       Belen Brown MD  10:45 AM  2/25/2021

## 2021-02-26 ENCOUNTER — TELEPHONE (OUTPATIENT)
Dept: SURGERY | Age: 46
End: 2021-02-26

## 2021-02-26 NOTE — TELEPHONE ENCOUNTER
Patient provided with surgery instructions during office visit. Patient scheduled for follow up visit with Dr. Juarez Bell on 03/18/2021. Surgery scheduling form faxed and confirmation received.     Electronically signed by Dami Shi MA on 2/26/2021 at 10:28 AM

## 2021-02-26 NOTE — TELEPHONE ENCOUNTER
Prior Authorization Form:      DEMOGRAPHICS:                     Patient Name:  Thlaia Aj  Patient :  1975            Insurance:  Payor: Julián Serve / Plan: Pam Rooney / Product Type: *No Product type* /   Insurance ID Number:    Payor/Plan Subscr  Sex Relation Sub. Ins. ID Effective Group Num   1.  77 [facility] 1975 Female Self [id number]                                   [address] [address]         DIAGNOSIS & PROCEDURE:                       Procedure/Operation: lap hiatal hernia repair w/ mesh           CPT Code: 81040    Diagnosis:  Hiatal hernia    ICD10 Code: K44.9    Location:  [address] [place] [address]    Surgeon:  Adam Ashford INFORMATION:                          Date: 2021    Time:               Anesthesia:                                                         Status:  Outpatient        Special Comments:           Electronically signed by Kristin Capellan MA on 2021 at 10:28 AM

## 2021-03-01 ENCOUNTER — HOSPITAL ENCOUNTER (OUTPATIENT)
Age: 46
Discharge: HOME | End: 2021-03-03
Payer: MEDICAID

## 2021-03-01 DIAGNOSIS — Z01.818 PREOP TESTING: ICD-10-CM

## 2021-03-01 PROCEDURE — U0003 INFECTIOUS AGENT DETECTION BY NUCLEIC ACID (DNA OR RNA); SEVERE ACUTE RESPIRATORY SYNDROME CORONAVIRUS 2 (SARS-COV-2) (CORONAVIRUS DISEASE [COVID-19]), AMPLIFIED PROBE TECHNIQUE, MAKING USE OF HIGH THROUGHPUT TECHNOLOGIES AS DESCRIBED BY CMS-2020-01-R: HCPCS

## 2021-03-02 ENCOUNTER — HOSPITAL ENCOUNTER (OUTPATIENT)
Dept: PREADMISSION TESTING | Age: 46
Discharge: HOME | End: 2021-03-02
Payer: MEDICAID

## 2021-03-02 VITALS
HEART RATE: 88 BPM | RESPIRATION RATE: 16 BRPM | TEMPERATURE: 98.4 F | HEIGHT: 67 IN | DIASTOLIC BLOOD PRESSURE: 71 MMHG | SYSTOLIC BLOOD PRESSURE: 117 MMHG | BODY MASS INDEX: 18.52 KG/M2 | OXYGEN SATURATION: 100 % | WEIGHT: 118 LBS

## 2021-03-02 DIAGNOSIS — Z01.818 PRE-OP TESTING: Primary | ICD-10-CM

## 2021-03-02 LAB
ANION GAP SERPL CALCULATED.3IONS-SCNC: 13 MMOL/L (ref 7–16)
BUN BLDV-MCNC: 15 MG/DL (ref 6–20)
CALCIUM SERPL-MCNC: 9.8 MG/DL (ref 8.6–10.2)
CHLORIDE BLD-SCNC: 101 MMOL/L (ref 98–107)
CO2: 25 MMOL/L (ref 22–29)
CREAT SERPL-MCNC: 0.7 MG/DL (ref 0.5–1)
GFR AFRICAN AMERICAN: >60
GFR NON-AFRICAN AMERICAN: >60 ML/MIN/1.73
GLUCOSE BLD-MCNC: 93 MG/DL (ref 74–99)
HCT VFR BLD CALC: 38.1 % (ref 34–48)
HEMOGLOBIN: 12.3 G/DL (ref 11.5–15.5)
MCH RBC QN AUTO: 27.7 PG (ref 26–35)
MCHC RBC AUTO-ENTMCNC: 32.3 % (ref 32–34.5)
MCV RBC AUTO: 85.8 FL (ref 80–99.9)
PDW BLD-RTO: 13.6 FL (ref 11.5–15)
PLATELET # BLD: 345 E9/L (ref 130–450)
PMV BLD AUTO: 10.1 FL (ref 7–12)
POTASSIUM REFLEX MAGNESIUM: 4.3 MMOL/L (ref 3.5–5)
RBC # BLD: 4.44 E12/L (ref 3.5–5.5)
SODIUM BLD-SCNC: 139 MMOL/L (ref 132–146)
WBC # BLD: 5.8 E9/L (ref 4.5–11.5)

## 2021-03-02 PROCEDURE — 80048 BASIC METABOLIC PNL TOTAL CA: CPT

## 2021-03-02 PROCEDURE — 36415 COLL VENOUS BLD VENIPUNCTURE: CPT

## 2021-03-02 PROCEDURE — 85027 COMPLETE CBC AUTOMATED: CPT

## 2021-03-02 RX ORDER — POLYETHYLENE GLYCOL 3350 17 G/17G
17 POWDER, FOR SOLUTION ORAL DAILY
COMMUNITY
End: 2021-12-27

## 2021-03-02 RX ORDER — SENNA PLUS 8.6 MG/1
2 TABLET ORAL DAILY PRN
COMMUNITY
End: 2021-12-27

## 2021-03-02 ASSESSMENT — PAIN DESCRIPTION - PAIN TYPE: TYPE: CHRONIC PAIN

## 2021-03-02 ASSESSMENT — PAIN DESCRIPTION - LOCATION: LOCATION: OTHER (COMMENT)

## 2021-03-02 ASSESSMENT — PAIN DESCRIPTION - ONSET: ONSET: ON-GOING

## 2021-03-02 ASSESSMENT — PAIN DESCRIPTION - PROGRESSION: CLINICAL_PROGRESSION: NOT CHANGED

## 2021-03-02 ASSESSMENT — PAIN DESCRIPTION - FREQUENCY: FREQUENCY: CONTINUOUS

## 2021-03-02 ASSESSMENT — PAIN SCALES - GENERAL: PAINLEVEL_OUTOF10: 7

## 2021-03-02 ASSESSMENT — PAIN - FUNCTIONAL ASSESSMENT: PAIN_FUNCTIONAL_ASSESSMENT: PREVENTS OR INTERFERES WITH MANY ACTIVE NOT PASSIVE ACTIVITIES

## 2021-03-02 NOTE — PROGRESS NOTES
[address] [facility]                                                                                                                    PRE OP INSTRUCTIONS FOR  [place] Patient        Date: 3/2/2021    Date of surgery: 3/5/2021   Arrival Time: Hospital will call you between 5pm and 7pm with your final arrival time for surgery    1. Do not eat or drink anything after midnight prior to surgery. This includes no water, chewing gum, mints or ice chips. 2. Take the following medications with a small sip of water on the morning of Surgery: benadryl, bring inhaler     3.     4. Aspirin, Ibuprofen, Advil, Naproxen, Vitamin E and other Anti-inflammatory products should be stopped  before surgery  as directed by your physician. Take Tylenol only unless instructed otherwise by your surgeon. 5.     6. Do not smoke,use illicit drugs and do not drink any alcoholic beverages 24 hours prior to surgery. 7. You may brush your teeth the morning of surgery. DO NOT SWALLOW WATER    8. You MUST make arrangements for a responsible adult to take you home after your surgery. You will not be allowed to leave alone or drive yourself home. It is strongly suggested someone stay with you the first 24 hrs. Your surgery will be cancelled if you do not have a ride home. 9.     10. Please wear simple, loose fitting clothing to the hospital.  Chelly Crooksut not bring valuables (money, credit cards, checkbooks, etc.) Do not wear any makeup (including no eye makeup) or nail polish on your fingers or toes. 11. DO NOT wear any jewelry or piercings on day of surgery. All body piercing jewelry must be removed. 12. Shower the night before surgery with _x__Antibacterial soap /SID WIPES________    13.     14.     15. If appropriate bring crutches, inspirex, WALKER, CANE etc...     12. Notify your Surgeon if you develop any illness between now and surgery time, cough, cold, fever, sore throat, nausea, vomiting, etc.  Please notify your surgeon if you experience dizziness, shortness of breath or blurred vision between now & the time of your surgery. 17. If you have ___dentures, they will be removed before going to the OR; we will provide you a container. If you wear ___contact lenses or _x__glasses, they will be removed; please bring a case for them. 18. To provide excellent care visitors will be limited to 1  in the room at any given time. 19.     20.                                                                                          21. During flu season no children under the age of 15 are permitted in the hospital for the safety of all patients. 22. Other come in main lobby and stop at                  Please call AMBULATORY CARE if you have any further questions.    [address] [facility]     [address]

## 2021-03-03 LAB
SARS-COV-2: NOT DETECTED
SOURCE: NORMAL

## 2021-03-04 ENCOUNTER — ANESTHESIA EVENT (OUTPATIENT)
Dept: OPERATING ROOM | Age: 46
End: 2021-03-04
Payer: MEDICAID

## 2021-03-04 NOTE — ANESTHESIA PRE PROCEDURE
Department of Anesthesiology  Preprocedure Note       Name:  Lajean Gitelman   Age:  41 y.o.  :  1975                                          MRN:  [id number]         Date:  3/4/2021      Surgeon: Anitha Stearns):  Belen Brown MD    Procedure: Procedure(s):  LAPAROSCOPIC HIATAL HERNIA REPAIR WITH MESH    Medications prior to admission:   Prior to Admission medications    Medication Sig Start Date End Date Taking? Authorizing Provider   polyethylene glycol (GLYCOLAX) 17 g packet Take 17 g by mouth daily 2 capfuls    Historical Provider, MD   senna (SENOKOT) 8.6 MG tablet Take 2 tablets by mouth daily as needed for Constipation    Historical Provider, MD   alum Hydroxide-Mag Carbonate 160-105 MG CHEW Take 2 tablets by mouth as needed    Historical Provider, MD   diphenhydrAMINE HCl (BENADRYL PO) Take 25 mg by mouth 2 times daily allergies    Historical Provider, MD   albuterol sulfate  (90 Base) MCG/ACT inhaler Inhale 2 puffs into the lungs every 4 hours as needed  21   Historical Provider, MD   ibuprofen (ADVIL;MOTRIN) 100 MG/5ML suspension Take 400 mg by mouth every 4 hours as needed  21   Historical Provider, MD   sertraline (ZOLOFT) 20 MG/ML concentrated solution Take 20 mg by mouth nightly     Historical Provider, MD       Current medications:    No current facility-administered medications for this encounter.       Current Outpatient Medications   Medication Sig Dispense Refill    polyethylene glycol (GLYCOLAX) 17 g packet Take 17 g by mouth daily 2 capfuls      senna (SENOKOT) 8.6 MG tablet Take 2 tablets by mouth daily as needed for Constipation      alum Hydroxide-Mag Carbonate 160-105 MG CHEW Take 2 tablets by mouth as needed      diphenhydrAMINE HCl (BENADRYL PO) Take 25 mg by mouth 2 times daily allergies      albuterol sulfate  (90 Base) MCG/ACT inhaler Inhale 2 puffs into the lungs every 4 hours as needed       ibuprofen (ADVIL;MOTRIN) 100 MG/5ML suspension Take 400 mg by mouth every 4 hours as needed       sertraline (ZOLOFT) 20 MG/ML concentrated solution Take 20 mg by mouth nightly          Allergies: Allergies   Allergen Reactions    Hydroxyzine     Amoxicillin-Pot Clavulanate      Tremors in legs    Cefdinir      Tremors in legs    Duloxetine Other (See Comments)     Hands go numb    Gabapentin Other (See Comments)     hallucinations    Naltrexone     Nitrofuran Derivatives     Nuts [Peanut-Containing Drug Products]     Omeprazole     Phenazopyridine     Prednisone Other (See Comments)     \"space out\"    Ranitidine Other (See Comments)     Leg Tremors    Reglan [Metoclopramide]     Sulfamethizole     Tramadol Nausea Only    Venlafaxine        Problem List:    Patient Active Problem List   Diagnosis Code    Pain, arm, right M79.601    Discoloration of skin of hand L81.9    Weakness of right arm R29.898    Complex regional pain syndrome [id number]    Carotid stenosis, right I65.21    Chest pain R07.9    Gastroesophageal reflux disease without esophagitis K21.9       Past Medical History:        Diagnosis Date    Anxiety     Asthma     Carotid stenosis, right 05/30/2018    30-40%    Chest pain 6/21/2018    Complex regional pain syndrome 2018    RIGHT UPPER EXT; [facility]    CRPS (complex regional pain syndrome type II)     Depression     Discoloration of skin of hand 6/8/2017    Environmental allergies     Fibromyalgia     GERD (gastroesophageal reflux disease)     Spinal stenosis     Sprained ankle     Weakness of right arm 6/8/2017       Past Surgical History:        Procedure Laterality Date    COLONOSCOPY  2013? Negative findings    ESOPHAGEAL MOTILITY STUDY N/A 2/12/2021    ESOPHAGEAL MOTILITY/MANOMETRY STUDY performed by Yumiko Pearl DO at [address] [place] [address]  2013?     GERD    UPPER GASTROINTESTINAL ENDOSCOPY N/A 2/12/2021    EGD BIOPSY performed by Cate Lopez MD at [facility] ENDOSCOPY    WISDOM TOOTH EXTRACTION         Social History:    Social History     Tobacco Use    Smoking status: Former Smoker     Types: Cigarettes     Start date: 1990     Quit date: 9/19/2017     Years since quitting: 3.4    Smokeless tobacco: Never Used   Substance Use Topics    Alcohol use: No                                Counseling given: Not Answered      Vital Signs (Current): There were no vitals filed for this visit. BP Readings from Last 3 Encounters:   03/02/21 117/71   02/25/21 116/81   02/12/21 101/63       NPO Status:                                                                                 BMI:   Wt Readings from Last 3 Encounters:   03/02/21 118 lb (53.5 kg)   02/25/21 120 lb (54.4 kg)   02/11/21 120 lb (54.4 kg)     There is no height or weight on file to calculate BMI.    CBC:   Lab Results   Component Value Date    WBC 5.8 03/02/2021    RBC 4.44 03/02/2021    HGB 12.3 03/02/2021    HCT 38.1 03/02/2021    MCV 85.8 03/02/2021    RDW 13.6 03/02/2021     03/02/2021       CMP:   Lab Results   Component Value Date     03/02/2021    K 4.3 03/02/2021     03/02/2021    CO2 25 03/02/2021    BUN 15 03/02/2021    CREATININE 0.7 03/02/2021    GFRAA >60 03/02/2021    LABGLOM >60 03/02/2021    GLUCOSE 93 03/02/2021    PROT 8.4 09/25/2020    CALCIUM 9.8 03/02/2021    BILITOT 0.5 09/25/2020    ALKPHOS 58 09/25/2020    AST 22 09/25/2020    ALT 9 09/25/2020       POC Tests: No results for input(s): POCGLU, POCNA, POCK, POCCL, POCBUN, POCHEMO, POCHCT in the last 72 hours.     Coags: No results found for: PROTIME, INR, APTT    HCG (If Applicable):   Lab Results   Component Value Date    PREGTESTUR NEGATIVE 02/12/2021        ABGs: No results found for: PHART, PO2ART, FRJ6NUJ, XIT5DJV, BEART, Y7PBSWQK     Type & Screen (If Applicable):  No results found for: LABABO, LABRH    Drug/Infectious Status (If Applicable):  No results found for: HIV, HEPCAB    COVID-19 Screening (If Applicable):   Lab Results   Component Value Date    COVID19 Not Detected 03/01/2021    COVID19 Not Detected 02/09/2021         Anesthesia Evaluation  Patient summary reviewed  Airway: Mallampati: III  TM distance: >3 FB   Neck ROM: full  Mouth opening: > = 3 FB Dental:      Comment: Dentition intact, missing multiple molars bilaterally. Patient denies any loose teeth. Pulmonary: breath sounds clear to auscultation  (+) asthma:                           ROS comment: Former 0.5 PPD x 25 years quit 9-   Cardiovascular:  Exercise tolerance: good (>4 METS),           Rhythm: regular  Rate: normal                    Neuro/Psych:   (+) neuromuscular disease (Fibromyalgia, spinal stenosis, Complex regional pain syndrome which affects the entire right side of her body. ):, psychiatric history:depression/anxiety             GI/Hepatic/Renal:   (+) GERD: poorly controlled,           Endo/Other: Negative Endo/Other ROS                    Abdominal:           Vascular:           ROS comment: Carotid artery stenosis on the right. Anesthesia Plan      general     ASA 3       Induction: intravenous. MIPS: Postoperative opioids intended. Anesthetic plan and risks discussed with patient. Plan discussed with CRNA.                   Esteban Luevano MD   3/4/2021

## 2021-03-05 ENCOUNTER — ANESTHESIA (OUTPATIENT)
Dept: OPERATING ROOM | Age: 46
End: 2021-03-05
Payer: MEDICAID

## 2021-03-05 ENCOUNTER — HOSPITAL ENCOUNTER (OUTPATIENT)
Age: 46
Setting detail: OUTPATIENT SURGERY
Discharge: HOME | End: 2021-03-05
Attending: SURGERY | Admitting: SURGERY
Payer: MEDICAID

## 2021-03-05 ENCOUNTER — PREP FOR PROCEDURE (OUTPATIENT)
Dept: SURGERY | Age: 46
End: 2021-03-05

## 2021-03-05 VITALS
OXYGEN SATURATION: 100 % | SYSTOLIC BLOOD PRESSURE: 130 MMHG | RESPIRATION RATE: 20 BRPM | DIASTOLIC BLOOD PRESSURE: 80 MMHG | TEMPERATURE: 98 F

## 2021-03-05 VITALS
RESPIRATION RATE: 14 BRPM | BODY MASS INDEX: 18.52 KG/M2 | SYSTOLIC BLOOD PRESSURE: 116 MMHG | DIASTOLIC BLOOD PRESSURE: 71 MMHG | HEIGHT: 67 IN | HEART RATE: 68 BPM | OXYGEN SATURATION: 100 % | WEIGHT: 118 LBS | TEMPERATURE: 97.2 F

## 2021-03-05 DIAGNOSIS — Z01.818 PREOP TESTING: Primary | ICD-10-CM

## 2021-03-05 LAB — HCG(URINE) PREGNANCY TEST: NEGATIVE

## 2021-03-05 PROCEDURE — 6360000002 HC RX W HCPCS

## 2021-03-05 PROCEDURE — 2780000010 HC IMPLANT OTHER: Performed by: SURGERY

## 2021-03-05 PROCEDURE — 3600000014 HC SURGERY LEVEL 4 ADDTL 15MIN: Performed by: SURGERY

## 2021-03-05 PROCEDURE — 6360000002 HC RX W HCPCS: Performed by: ANESTHESIOLOGY

## 2021-03-05 PROCEDURE — 7100000010 HC PHASE II RECOVERY - FIRST 15 MIN: Performed by: SURGERY

## 2021-03-05 PROCEDURE — 81025 URINE PREGNANCY TEST: CPT

## 2021-03-05 PROCEDURE — C1781 MESH (IMPLANTABLE): HCPCS | Performed by: SURGERY

## 2021-03-05 PROCEDURE — 3700000000 HC ANESTHESIA ATTENDED CARE: Performed by: SURGERY

## 2021-03-05 PROCEDURE — 2709999900 HC NON-CHARGEABLE SUPPLY: Performed by: SURGERY

## 2021-03-05 PROCEDURE — 7100000001 HC PACU RECOVERY - ADDTL 15 MIN: Performed by: SURGERY

## 2021-03-05 PROCEDURE — 6360000002 HC RX W HCPCS: Performed by: NURSE ANESTHETIST, CERTIFIED REGISTERED

## 2021-03-05 PROCEDURE — 2500000003 HC RX 250 WO HCPCS: Performed by: SURGERY

## 2021-03-05 PROCEDURE — 15734 MUSCLE-SKIN GRAFT TRUNK: CPT | Performed by: SURGERY

## 2021-03-05 PROCEDURE — 2720000010 HC SURG SUPPLY STERILE: Performed by: SURGERY

## 2021-03-05 PROCEDURE — 2580000003 HC RX 258: Performed by: ANESTHESIOLOGY

## 2021-03-05 PROCEDURE — 7100000011 HC PHASE II RECOVERY - ADDTL 15 MIN: Performed by: SURGERY

## 2021-03-05 PROCEDURE — 7100000000 HC PACU RECOVERY - FIRST 15 MIN: Performed by: SURGERY

## 2021-03-05 PROCEDURE — 3700000001 HC ADD 15 MINUTES (ANESTHESIA): Performed by: SURGERY

## 2021-03-05 PROCEDURE — 2500000003 HC RX 250 WO HCPCS

## 2021-03-05 PROCEDURE — 43282 LAP PARAESOPH HER RPR W/MESH: CPT | Performed by: SURGERY

## 2021-03-05 PROCEDURE — 3600000004 HC SURGERY LEVEL 4 BASE: Performed by: SURGERY

## 2021-03-05 DEVICE — LINX REFLUX MANAGEMENT SYSTEM
Type: IMPLANTABLE DEVICE | Status: FUNCTIONAL
Brand: LINX

## 2021-03-05 DEVICE — MESH SURG W7XL10CM SEPRA TECHNOLOGY RECT PHASIX: Type: IMPLANTABLE DEVICE | Status: FUNCTIONAL

## 2021-03-05 RX ORDER — LIDOCAINE HYDROCHLORIDE 20 MG/ML
INJECTION, SOLUTION INFILTRATION; PERINEURAL PRN
Status: DISCONTINUED | OUTPATIENT
Start: 2021-03-05 | End: 2021-03-05 | Stop reason: SDUPTHER

## 2021-03-05 RX ORDER — MIDAZOLAM HYDROCHLORIDE 1 MG/ML
INJECTION INTRAMUSCULAR; INTRAVENOUS PRN
Status: DISCONTINUED | OUTPATIENT
Start: 2021-03-05 | End: 2021-03-05 | Stop reason: SDUPTHER

## 2021-03-05 RX ORDER — SODIUM CHLORIDE 0.9 % (FLUSH) 0.9 %
10 SYRINGE (ML) INJECTION EVERY 12 HOURS SCHEDULED
Status: CANCELLED | OUTPATIENT
Start: 2021-03-05

## 2021-03-05 RX ORDER — LABETALOL HYDROCHLORIDE 5 MG/ML
5 INJECTION, SOLUTION INTRAVENOUS EVERY 10 MIN PRN
Status: DISCONTINUED | OUTPATIENT
Start: 2021-03-05 | End: 2021-03-05 | Stop reason: HOSPADM

## 2021-03-05 RX ORDER — PROCHLORPERAZINE EDISYLATE 5 MG/ML
5 INJECTION INTRAMUSCULAR; INTRAVENOUS
Status: DISCONTINUED | OUTPATIENT
Start: 2021-03-05 | End: 2021-03-05 | Stop reason: HOSPADM

## 2021-03-05 RX ORDER — CEFAZOLIN SODIUM 2 G/50ML
SOLUTION INTRAVENOUS
Status: COMPLETED
Start: 2021-03-05 | End: 2021-03-05

## 2021-03-05 RX ORDER — FENTANYL CITRATE 50 UG/ML
INJECTION, SOLUTION INTRAMUSCULAR; INTRAVENOUS PRN
Status: DISCONTINUED | OUTPATIENT
Start: 2021-03-05 | End: 2021-03-05 | Stop reason: SDUPTHER

## 2021-03-05 RX ORDER — NEOSTIGMINE METHYLSULFATE 1 MG/ML
INJECTION, SOLUTION INTRAVENOUS PRN
Status: DISCONTINUED | OUTPATIENT
Start: 2021-03-05 | End: 2021-03-05 | Stop reason: SDUPTHER

## 2021-03-05 RX ORDER — CEFAZOLIN SODIUM 2 G/50ML
SOLUTION INTRAVENOUS PRN
Status: DISCONTINUED | OUTPATIENT
Start: 2021-03-05 | End: 2021-03-05 | Stop reason: SDUPTHER

## 2021-03-05 RX ORDER — CEFAZOLIN SODIUM 2 G/50ML
2000 SOLUTION INTRAVENOUS
Status: DISCONTINUED | OUTPATIENT
Start: 2021-03-05 | End: 2021-03-05 | Stop reason: HOSPADM

## 2021-03-05 RX ORDER — OXYCODONE HYDROCHLORIDE AND ACETAMINOPHEN 5; 325 MG/1; MG/1
1 TABLET ORAL EVERY 4 HOURS PRN
Status: DISCONTINUED | OUTPATIENT
Start: 2021-03-05 | End: 2021-03-05 | Stop reason: HOSPADM

## 2021-03-05 RX ORDER — OXYCODONE HYDROCHLORIDE AND ACETAMINOPHEN 5; 325 MG/1; MG/1
1 TABLET ORAL EVERY 6 HOURS PRN
Qty: 20 TABLET | Refills: 0 | Status: SHIPPED | OUTPATIENT
Start: 2021-03-05 | End: 2021-03-10

## 2021-03-05 RX ORDER — PROMETHAZINE HYDROCHLORIDE 25 MG/ML
6.25 INJECTION, SOLUTION INTRAMUSCULAR; INTRAVENOUS
Status: DISCONTINUED | OUTPATIENT
Start: 2021-03-05 | End: 2021-03-05 | Stop reason: HOSPADM

## 2021-03-05 RX ORDER — SODIUM CHLORIDE, SODIUM LACTATE, POTASSIUM CHLORIDE, CALCIUM CHLORIDE 600; 310; 30; 20 MG/100ML; MG/100ML; MG/100ML; MG/100ML
INJECTION, SOLUTION INTRAVENOUS CONTINUOUS
Status: CANCELLED | OUTPATIENT
Start: 2021-03-05

## 2021-03-05 RX ORDER — MEPERIDINE HYDROCHLORIDE 25 MG/ML
12.5 INJECTION INTRAMUSCULAR; INTRAVENOUS; SUBCUTANEOUS EVERY 5 MIN PRN
Status: DISCONTINUED | OUTPATIENT
Start: 2021-03-05 | End: 2021-03-05 | Stop reason: HOSPADM

## 2021-03-05 RX ORDER — ONDANSETRON 2 MG/ML
INJECTION INTRAMUSCULAR; INTRAVENOUS PRN
Status: DISCONTINUED | OUTPATIENT
Start: 2021-03-05 | End: 2021-03-05 | Stop reason: SDUPTHER

## 2021-03-05 RX ORDER — PROPOFOL 10 MG/ML
INJECTION, EMULSION INTRAVENOUS PRN
Status: DISCONTINUED | OUTPATIENT
Start: 2021-03-05 | End: 2021-03-05 | Stop reason: SDUPTHER

## 2021-03-05 RX ORDER — SODIUM CHLORIDE, SODIUM LACTATE, POTASSIUM CHLORIDE, CALCIUM CHLORIDE 600; 310; 30; 20 MG/100ML; MG/100ML; MG/100ML; MG/100ML
INJECTION, SOLUTION INTRAVENOUS CONTINUOUS
Status: DISCONTINUED | OUTPATIENT
Start: 2021-03-05 | End: 2021-03-05 | Stop reason: HOSPADM

## 2021-03-05 RX ORDER — IBUPROFEN 800 MG/1
800 TABLET ORAL EVERY 6 HOURS PRN
Qty: 20 TABLET | Refills: 0 | Status: SHIPPED | OUTPATIENT
Start: 2021-03-05 | End: 2021-05-06

## 2021-03-05 RX ORDER — GLYCOPYRROLATE 1 MG/5 ML
SYRINGE (ML) INTRAVENOUS PRN
Status: DISCONTINUED | OUTPATIENT
Start: 2021-03-05 | End: 2021-03-05 | Stop reason: SDUPTHER

## 2021-03-05 RX ORDER — BUPIVACAINE HYDROCHLORIDE AND EPINEPHRINE 2.5; 5 MG/ML; UG/ML
INJECTION, SOLUTION EPIDURAL; INFILTRATION; INTRACAUDAL; PERINEURAL PRN
Status: DISCONTINUED | OUTPATIENT
Start: 2021-03-05 | End: 2021-03-05 | Stop reason: ALTCHOICE

## 2021-03-05 RX ORDER — SODIUM CHLORIDE 0.9 % (FLUSH) 0.9 %
10 SYRINGE (ML) INJECTION PRN
Status: CANCELLED | OUTPATIENT
Start: 2021-03-05

## 2021-03-05 RX ORDER — SODIUM CHLORIDE 0.9 % (FLUSH) 0.9 %
10 SYRINGE (ML) INJECTION PRN
Status: DISCONTINUED | OUTPATIENT
Start: 2021-03-05 | End: 2021-03-05 | Stop reason: HOSPADM

## 2021-03-05 RX ORDER — ROCURONIUM BROMIDE 10 MG/ML
INJECTION, SOLUTION INTRAVENOUS PRN
Status: DISCONTINUED | OUTPATIENT
Start: 2021-03-05 | End: 2021-03-05 | Stop reason: SDUPTHER

## 2021-03-05 RX ORDER — SODIUM CHLORIDE 0.9 % (FLUSH) 0.9 %
10 SYRINGE (ML) INJECTION EVERY 12 HOURS SCHEDULED
Status: DISCONTINUED | OUTPATIENT
Start: 2021-03-05 | End: 2021-03-05 | Stop reason: HOSPADM

## 2021-03-05 RX ORDER — DIPHENHYDRAMINE HYDROCHLORIDE 50 MG/ML
12.5 INJECTION INTRAMUSCULAR; INTRAVENOUS
Status: DISCONTINUED | OUTPATIENT
Start: 2021-03-05 | End: 2021-03-05 | Stop reason: HOSPADM

## 2021-03-05 RX ADMIN — PHENYLEPHRINE HYDROCHLORIDE 100 MCG: 10 INJECTION INTRAVENOUS at 10:12

## 2021-03-05 RX ADMIN — SODIUM CHLORIDE, POTASSIUM CHLORIDE, SODIUM LACTATE AND CALCIUM CHLORIDE: 600; 310; 30; 20 INJECTION, SOLUTION INTRAVENOUS at 10:45

## 2021-03-05 RX ADMIN — FENTANYL CITRATE 50 MCG: 50 INJECTION, SOLUTION INTRAMUSCULAR; INTRAVENOUS at 09:55

## 2021-03-05 RX ADMIN — PHENYLEPHRINE HYDROCHLORIDE 100 MCG: 10 INJECTION INTRAVENOUS at 10:14

## 2021-03-05 RX ADMIN — CEFAZOLIN SODIUM 2 G: 2 SOLUTION INTRAVENOUS at 09:56

## 2021-03-05 RX ADMIN — PHENYLEPHRINE HYDROCHLORIDE 200 MCG: 10 INJECTION INTRAVENOUS at 10:17

## 2021-03-05 RX ADMIN — HYDROMORPHONE HYDROCHLORIDE 0.5 MG: 1 INJECTION, SOLUTION INTRAMUSCULAR; INTRAVENOUS; SUBCUTANEOUS at 11:22

## 2021-03-05 RX ADMIN — MIDAZOLAM 50 MG: 1 INJECTION INTRAMUSCULAR; INTRAVENOUS at 09:55

## 2021-03-05 RX ADMIN — ROCURONIUM BROMIDE 100 MG: 10 SOLUTION INTRAVENOUS at 09:55

## 2021-03-05 RX ADMIN — MIDAZOLAM 2 MG: 1 INJECTION INTRAMUSCULAR; INTRAVENOUS at 09:48

## 2021-03-05 RX ADMIN — Medication 3 MG: at 10:50

## 2021-03-05 RX ADMIN — FENTANYL CITRATE 50 MCG: 50 INJECTION, SOLUTION INTRAMUSCULAR; INTRAVENOUS at 10:58

## 2021-03-05 RX ADMIN — ONDANSETRON 4 MG: 2 INJECTION INTRAMUSCULAR; INTRAVENOUS at 10:48

## 2021-03-05 RX ADMIN — PHENYLEPHRINE HYDROCHLORIDE 100 MCG: 10 INJECTION INTRAVENOUS at 10:31

## 2021-03-05 RX ADMIN — LIDOCAINE HYDROCHLORIDE 80 MG: 20 INJECTION, SOLUTION INFILTRATION; PERINEURAL at 09:55

## 2021-03-05 RX ADMIN — PROPOFOL 160 MG: 10 INJECTION, EMULSION INTRAVENOUS at 09:55

## 2021-03-05 RX ADMIN — SODIUM CHLORIDE, POTASSIUM CHLORIDE, SODIUM LACTATE AND CALCIUM CHLORIDE: 600; 310; 30; 20 INJECTION, SOLUTION INTRAVENOUS at 08:02

## 2021-03-05 RX ADMIN — Medication 0.6 MG: at 10:50

## 2021-03-05 RX ADMIN — FENTANYL CITRATE 100 MCG: 50 INJECTION, SOLUTION INTRAMUSCULAR; INTRAVENOUS at 10:53

## 2021-03-05 ASSESSMENT — PULMONARY FUNCTION TESTS
PIF_VALUE: 16
PIF_VALUE: 18
PIF_VALUE: 5
PIF_VALUE: 19
PIF_VALUE: 21
PIF_VALUE: 14
PIF_VALUE: 16
PIF_VALUE: 21
PIF_VALUE: 19
PIF_VALUE: 22
PIF_VALUE: 3
PIF_VALUE: 17
PIF_VALUE: 18
PIF_VALUE: 21
PIF_VALUE: 0
PIF_VALUE: 14
PIF_VALUE: 14
PIF_VALUE: 15
PIF_VALUE: 20
PIF_VALUE: 18
PIF_VALUE: 17
PIF_VALUE: 21
PIF_VALUE: 0
PIF_VALUE: 2
PIF_VALUE: 17
PIF_VALUE: 12
PIF_VALUE: 0
PIF_VALUE: 16
PIF_VALUE: 17
PIF_VALUE: 2
PIF_VALUE: 22
PIF_VALUE: 18
PIF_VALUE: 5

## 2021-03-05 ASSESSMENT — PAIN DESCRIPTION - DESCRIPTORS: DESCRIPTORS: DISCOMFORT;PRESSURE;SORE

## 2021-03-05 ASSESSMENT — PAIN DESCRIPTION - ORIENTATION
ORIENTATION: MID;UPPER
ORIENTATION: MID;UPPER

## 2021-03-05 ASSESSMENT — PAIN SCALES - GENERAL
PAINLEVEL_OUTOF10: 0
PAINLEVEL_OUTOF10: 2
PAINLEVEL_OUTOF10: 7
PAINLEVEL_OUTOF10: 2

## 2021-03-05 ASSESSMENT — PAIN DESCRIPTION - PAIN TYPE: TYPE: SURGICAL PAIN

## 2021-03-05 ASSESSMENT — PAIN DESCRIPTION - PROGRESSION: CLINICAL_PROGRESSION: NOT CHANGED

## 2021-03-05 ASSESSMENT — PAIN DESCRIPTION - LOCATION: LOCATION: ABDOMEN

## 2021-03-05 ASSESSMENT — PAIN DESCRIPTION - FREQUENCY: FREQUENCY: INTERMITTENT

## 2021-03-05 NOTE — ANESTHESIA POSTPROCEDURE EVALUATION
Department of Anesthesiology  Postprocedure Note    Patient: Carol Sheppard  MRN: [id number]  YOB: 1975  Date of evaluation: 3/5/2021  Time:  12:09 PM     Procedure Summary     Date: 03/05/21 Room / Location: [address] [place] [address] 06 / [facility]    Anesthesia Start: 4066 Anesthesia Stop: 3306    Procedure: [address] (N/A ) Diagnosis: (HIATAL HERNIA)    Surgeons: Jovita Jason MD Responsible Provider: Kyaw Su MD    Anesthesia Type: general ASA Status: 3          Anesthesia Type: general    Ed Phase I: Ed Score: 10    Ed Phase II:      Last vitals: Reviewed and per EMR flowsheets.        Anesthesia Post Evaluation    Patient location during evaluation: PACU  Patient participation: complete - patient participated  Level of consciousness: awake  Pain score: 4  Airway patency: patent  Nausea & Vomiting: no nausea and no vomiting  Complications: no  Cardiovascular status: hemodynamically stable  Respiratory status: acceptable  Hydration status: euvolemic

## 2021-03-05 NOTE — OP NOTE
DATE OF PROCEDURE: 3/5/2021    SURGEON: Herschell Sicard, M.D.    ASSISTANT: ines    PREOPERATIVE DIAGNOSIS: Large paraesophageal midline hiatal hernia with severe reflux and muscle disruption. POSTOPERATIVE DIAGNOSIS: same    OPERATION:   1.)Laparoscopic midline paraesophageal hiatal hernia repair  with mesh  2.) romy cutaneous flap and  3.) magnetic sphincter augmentation      ANESTHESIA: General.    ESTIMATED BLOOD LOSS: 40 mL. COMPLICATIONS: None. FLUIDS: Crystalloid. DISPOSITION: Will be admitted to the floor for routine postoperative care. SPECIMEN: None. INDICATION: This is a patient who came in with the aforementioned  diagnosis. The patient had severe reflux. I explained the risks, benefits,  potential outcomes, alternative treatment to aforementioned procedure, and  she agreed to proceed, understanding those risks and potential outcomes. DESCRIPTION OF PROCEDURE: The patient was brought to the operative suite,  was placed under general anesthesia, was given preoperative antibiotics  within 30 minutes of incision, then had bilateral PCDs placed. Once this was done a 5 mm incision was made in the supraumbilical area. After local anesthetic was infiltrated into the abdominal wall, a Veress  needle was used to pass into the peritoneum. CO2 was then used to insufflate  the abdomen to a pressure of 15 mmHg. At this time, the Veress needle was  removed, and an 5 mm trocar was placed through this incision. Four  additional trocars were placed, 2 in the right lateral abdomen, one a 5 mm  trocar that was scythed through the rectus sheath at 2 different levels. An  additional 5 mm trocar was placed in the right lateral abdomen. An 5 mm  trocar was also placed in the left upper quadrant of the abdomen. At this time,  a Melissa retractor was put into this to retract the liver,  exposing the hiatal hernia. Hiatal hernia was identified.  The hernia sac  and its crural attachments were taken down using electrocautery and blunt  dissection. We were able to expose the entire esophagus and stomach that was  into this hiatal hernia and reduce it into the abdomen. We bluntly dissected  around the entire esophagus, mobilizing that down into the abdomen to have  approximately 3 cm of intraabdominal esophagus. At this time, we repaired the crura in a posterior fashion with a running,  nonabsorbable V-Loc suture. Once this was completed, a Bio A mesh was cut to keyhole around the esophagus and was placed posteriorly and sewn in place with v lock vicryl sutures. At this point a window was made above the posterior vagus behind the esophagus at 2cm from the GE junction, a penrose was passed, the esophagus was measured with the linx sizer and a 15 size linx was brought on to the field and placed and appropriately clasped. A falciform myofascial flap was then placed by taking the falciform down from abdominal wall and was placed posteriorly around to buttress our repair and protect the esophagus from the mesh. This was to support our repair as well as to  anchor the stomach and esophagus intra-abdominally. This was also done with  V mitchell sutures. Once this was completed, an EGD scope was done to assure that  this was proper. The repair did not appear to be too tight, and the  endoscope passed easily into the stomach. It was retroflexed at that point  and the hiatus was intact without appearing to be too tight. The endoscope  was then removed. . Pneumoperitoneum was  evacuated. All lap and suture counts were correct, and the trocar sites were  closed with 4-0 Monocryl sutures in a subcuticular fashion, and then  Dermabond was placed. The patient tolerated the procedure well and was taken to PACU.

## 2021-03-05 NOTE — H&P
General Surgery History and Physical     Patient's Name/Date of Birth: Keila Bailey / 1975     Date: 3/3/21      Surgeon: Callie Reyes M.D.     PCP: Enrique Forrest DO     Chief Complaint: hiatal hernia     HPI:   Keila Bailey is a 41 y.o. female who presents for evaluation of symptomatic hiatal hernia that was responsive to medical treatment but has become recalcitrant. Has severe GERD, EGD showed 4cm HH, Has normal manometry. Has had no other imaging.  Timing is constant, radiation to epigastrium, alleviated by nothing and started years ago and severity is 2-7/10         Past Medical History        Past Medical History:   Diagnosis Date    Asthma      Carotid stenosis, right 05/30/2018     30-40%    Chest pain 6/21/2018    Complex regional pain syndrome 2018     RIGHT UPPER EXT; [facility]    CRPS (complex regional pain syndrome type II)      Depression      Discoloration of skin of hand 6/8/2017    Environmental allergies      Fibromyalgia      GERD (gastroesophageal reflux disease)      Spinal stenosis      Sprained ankle      Weakness of right arm 6/8/2017            Past Surgical History         Past Surgical History:   Procedure Laterality Date    COLONOSCOPY   2013?     Negative findings    ESOPHAGEAL MOTILITY STUDY N/A 2/12/2021     ESOPHAGEAL MOTILITY/MANOMETRY STUDY performed by Adenike Garcia DO at [address] [facility]   2013?     GERD    UPPER GASTROINTESTINAL ENDOSCOPY N/A 2/12/2021     EGD BIOPSY performed by Alonzo Sanchez MD at [address] [place] [address]                Current Facility-Administered Medications          Current Outpatient Medications   Medication Sig Dispense Refill    diphenhydrAMINE HCl (BENADRYL PO) Take 25 mg by mouth 2 times daily        Alum Hydroxide-Mag Carbonate (GAVISCON PO) Take by mouth daily as needed        albuterol sulfate  (90 Base) MCG/ACT inhaler 2 puffs as needed        ibuprofen (ADVIL;MOTRIN) 100 MG/5ML suspension take 20 milliliters by mouth three times a day for 10 days with food or milk        lidocaine viscous hcl (XYLOCAINE) 2 % SOLN solution SWISH AND SPIT 15 MILLILITERS BY MOUTH THREE TIMES A DAY AS NEEDED FOR 10 DAYS        sertraline (ZOLOFT) 20 MG/ML concentrated solution Take 20 mg by mouth nightly           No current facility-administered medications for this visit.                   Allergies   Allergen Reactions    Amoxicillin-Pot Clavulanate         Tremors in legs    Cefdinir         Tremors in legs    Duloxetine Other (See Comments)       Hands go numb    Gabapentin Other (See Comments)    Hydroxyquinolines      Naltrexone      Nitrofuran Derivatives      Omeprazole      Phenazopyridine      Prednisone Other (See Comments)       \"space out\"    Ranitidine Other (See Comments)       Leg Tremors    Sulfamethizole      Tramadol Nausea Only    Venlafaxine           The patient has a family history that is negative for severe cardiovascular or respiratory issues, negative for reaction to anesthesia.     Social History               Socioeconomic History    Marital status:        Spouse name: Not on file    Number of children: Not on file    Years of education: Not on file    Highest education level: Not on file   Occupational History    Not on file   Social Needs    Financial resource strain: Not on file    Food insecurity       Worry: Not on file       Inability: Not on file    Transportation needs       Medical: Not on file       Non-medical: Not on file   Tobacco Use    Smoking status: Former Smoker       Types: Cigarettes       Start date: 200       Quit date: 9/19/2017       Years since quitting: 3.4    Smokeless tobacco: Never Used   Substance and Sexual Activity    Alcohol use: No    Drug use: No    Sexual activity: Not Currently   Lifestyle    Physical activity       Days per week: Not on file       Minutes per session: Not on file    Stress: Not on file   Relationships    Social connections       Talks on phone: Not on file       Gets together: Not on file       Attends Judaism service: Not on file       Active member of club or organization: Not on file       Attends meetings of clubs or organizations: Not on file       Relationship status: Not on file    Intimate partner violence       Fear of current or ex partner: Not on file       Emotionally abused: Not on file       Physically abused: Not on file       Forced sexual activity: Not on file   Other Topics Concern    Not on file   Social History Narrative    Not on file                  Review of Systems  Review of Systems -  General ROS: negative for - chills, fatigue or malaise  ENT ROS: negative for - hearing change, nasal congestion or nasal discharge  Allergy and Immunology ROS: negative for - hives, itchy/watery eyes or nasal congestion  Hematological and Lymphatic ROS: negative for - blood clots, blood transfusions, bruising or fatigue  Endocrine ROS: negative for - malaise/lethargy, mood swings, palpitations or polydipsia/polyuria  Respiratory ROS: negative for - sputum changes, stridor, tachypnea or wheezing  Cardiovascular ROS: negative for - irregular heartbeat, loss of consciousness, murmur or orthopnea  Gastrointestinal ROS: negative for - constipation, diarrhea, gas/bloating, or hematemesis, positive for heartburn and other epigastric pain  Genito-Urinary ROS: negative for -  genital discharge, genital ulcers or hematuria  Musculoskeletal ROS: negative for - gait disturbance, muscle pain or muscular weakness     Physical exam:   [facility] 02/13/2021     General appearance:  NAD  Pyscho/social: negative for tremors and hallucinations  Head: NCAT, PERRLA, EOMI, red conjunctiva  Neck: supple, no masses  Lungs: CTAB, equal chest rise bilateral  Heart: Reg rate  Abdomen: soft, nontender, nondistended  Skin; no lesions  Gu: no cva tenderness  Extremities: extremities normal, atraumatic, no cyanosis or edema        Assessment:  41 y.o. female with  symptomatic hiatal hernia     Plan:   TO OR for HHR with possible LINX  Discussed the risk, benefits and alternatives of surgery including wound infections, bleeding, scar and hernia formation and the risks of general anesthetic including MI, CVA, sudden death or reactions to anesthetic medications. The patient understands the risks and alternatives and the possibility of converting to an open procedure.  All questions were answered to the patient's satisfaction and they freely signed the consent.        Brittni Kern MD

## 2021-03-11 ENCOUNTER — OFFICE VISIT (OUTPATIENT)
Dept: SURGERY | Age: 46
End: 2021-03-11

## 2021-03-11 VITALS
HEART RATE: 89 BPM | SYSTOLIC BLOOD PRESSURE: 114 MMHG | HEIGHT: 67 IN | DIASTOLIC BLOOD PRESSURE: 75 MMHG | TEMPERATURE: 98 F | BODY MASS INDEX: 18.52 KG/M2 | WEIGHT: 118 LBS | OXYGEN SATURATION: 98 %

## 2021-03-11 DIAGNOSIS — K44.9 HIATAL HERNIA: Primary | ICD-10-CM

## 2021-03-11 PROCEDURE — 99024 POSTOP FOLLOW-UP VISIT: CPT | Performed by: SURGERY

## 2021-03-11 NOTE — PROGRESS NOTES
Surgery Progress Note            Chief complaint:   Patient Active Problem List   Diagnosis    Pain, arm, right    Discoloration of skin of hand    Weakness of right arm    Complex regional pain syndrome    Carotid stenosis, right    Chest pain    Gastroesophageal reflux disease without esophagitis    Hiatal hernia       S: doing well    O:   Vitals:    03/11/21 1117   BP: 114/75   Pulse: 89   Temp: 98 °F (36.7 °C)   SpO2: 98%     No intake or output data in the 24 hours ending 03/11/21 1127        Labs:  No results for input(s): WBC, HGB, HCT in the last 72 hours. Invalid input(s): PLR  Lab Results   Component Value Date    CREATININE 0.7 03/02/2021    BUN 15 03/02/2021     03/02/2021    K 4.3 03/02/2021     03/02/2021    CO2 25 03/02/2021     No results for input(s): LIPASE, AMYLASE in the last 72 hours. Physical exam:   /75   Pulse 89   Temp 98 °F (36.7 °C) (Temporal)   Ht 5' 7\" (1.702 m)   Wt 118 lb (53.5 kg)   LMP 02/13/2021   SpO2 98%   BMI 18.48 kg/m²   General appearance: NAD  Head: NCAT  Neck: supple, no masses  Lungs: equal chest rise bilateral  Heart: S1S2 present  Abdomen: soft, nontender, nondistended  Skin; no new lesions, incisions clean and intact  Gu: no cva tenderness  Extremities: extremities normal, atraumatic, no cyanosis or edema    A:  Post op lap HHR with mesh and concern for incisions but they look fine. P: follow up as needed.      Randy Montero MD  3/11/2021

## 2021-03-15 ENCOUNTER — TELEPHONE (OUTPATIENT)
Dept: NEUROLOGY | Age: 46
End: 2021-03-15

## 2021-03-15 NOTE — TELEPHONE ENCOUNTER
Received first referral on 2/3/2021 from Dr. Kole Rosado office for Dx of Complex regional pain syndrome. Per Dr. Audrey Mari this should go to pain management. Received same referral again on 3/2/2021 stating that patient was told by someone that we would see her for this. Per Dr. Ronald Peacock, and Dr. Ngo Said, they will not see for this Dx and patient should be seen in pain clinic. Called Dr. Kole Rosado office to let them know.

## 2021-03-18 ENCOUNTER — OFFICE VISIT (OUTPATIENT)
Dept: SURGERY | Age: 46
End: 2021-03-18

## 2021-03-18 VITALS
HEIGHT: 67 IN | DIASTOLIC BLOOD PRESSURE: 75 MMHG | WEIGHT: 118 LBS | TEMPERATURE: 98.1 F | BODY MASS INDEX: 18.52 KG/M2 | SYSTOLIC BLOOD PRESSURE: 111 MMHG | HEART RATE: 100 BPM

## 2021-03-18 DIAGNOSIS — K44.9 HIATAL HERNIA: Primary | ICD-10-CM

## 2021-03-18 PROCEDURE — 99024 POSTOP FOLLOW-UP VISIT: CPT | Performed by: SURGERY

## 2021-03-18 NOTE — PROGRESS NOTES
Surgery Progress Note            Chief complaint:   Patient Active Problem List   Diagnosis    Pain, arm, right    Discoloration of skin of hand    Weakness of right arm    Complex regional pain syndrome    Carotid stenosis, right    Chest pain    Gastroesophageal reflux disease without esophagitis    Hiatal hernia       S: doing well    O:   Vitals:    03/18/21 0914   BP: 111/75   Pulse: 100   Temp: 98.1 °F (36.7 °C)     No intake or output data in the 24 hours ending 03/18/21 0921        Labs:  No results for input(s): WBC, HGB, HCT in the last 72 hours. Invalid input(s): PLR  Lab Results   Component Value Date    CREATININE 0.7 03/02/2021    BUN 15 03/02/2021     03/02/2021    K 4.3 03/02/2021     03/02/2021    CO2 25 03/02/2021     No results for input(s): LIPASE, AMYLASE in the last 72 hours. Physical exam:   /75   Pulse 100   Temp 98.1 °F (36.7 °C) (Temporal)   Ht 5' 7\" (1.702 m)   Wt 118 lb (53.5 kg)   BMI 18.48 kg/m²   General appearance: NAD  Head: NCAT  Neck: supple, no masses  Lungs: equal chest rise bilateral  Heart: S1S2 present  Abdomen: soft, nontender, nondistended  Skin; no new lesions, incisions clean and intact  Gu: no cva tenderness  Extremities: extremities normal, atraumatic, no cyanosis or edema    A:  Post op lap hiatal hernia repair with mesh    P: follow up as needed.      Jose Maradiaga MD  3/18/2021

## 2021-04-26 ENCOUNTER — IMMUNIZATION (OUTPATIENT)
Dept: PRIMARY CARE CLINIC | Age: 46
End: 2021-04-26
Payer: MEDICAID

## 2021-04-26 PROCEDURE — 0002A COVID-19, PFIZER VACCINE 30MCG/0.3ML DOSE: CPT | Performed by: NURSE PRACTITIONER

## 2021-04-26 PROCEDURE — 91300 COVID-19, PFIZER VACCINE 30MCG/0.3ML DOSE: CPT | Performed by: NURSE PRACTITIONER

## 2021-05-03 ENCOUNTER — APPOINTMENT (OUTPATIENT)
Dept: CT IMAGING | Age: 46
End: 2021-05-03
Payer: MEDICAID

## 2021-05-03 ENCOUNTER — HOSPITAL ENCOUNTER (EMERGENCY)
Age: 46
Discharge: HOME | End: 2021-05-04
Attending: EMERGENCY MEDICINE
Payer: MEDICAID

## 2021-05-03 DIAGNOSIS — R11.2 NAUSEA AND VOMITING, INTRACTABILITY OF VOMITING NOT SPECIFIED, UNSPECIFIED VOMITING TYPE: Primary | ICD-10-CM

## 2021-05-03 LAB
ALBUMIN SERPL-MCNC: 4.5 G/DL (ref 3.5–5.2)
ALP BLD-CCNC: 53 U/L (ref 35–104)
ALT SERPL-CCNC: 7 U/L (ref 0–32)
ANION GAP SERPL CALCULATED.3IONS-SCNC: 8 MMOL/L (ref 7–16)
AST SERPL-CCNC: 16 U/L (ref 0–31)
BACTERIA: ABNORMAL /HPF
BILIRUB SERPL-MCNC: 0.3 MG/DL (ref 0–1.2)
BILIRUBIN URINE: NEGATIVE
BLOOD, URINE: ABNORMAL
BUN BLDV-MCNC: 9 MG/DL (ref 6–20)
CALCIUM SERPL-MCNC: 9.6 MG/DL (ref 8.6–10.2)
CHLORIDE BLD-SCNC: 103 MMOL/L (ref 98–107)
CLARITY: CLEAR
CO2: 25 MMOL/L (ref 22–29)
COLOR: YELLOW
CREAT SERPL-MCNC: 0.7 MG/DL (ref 0.5–1)
EPITHELIAL CELLS, UA: ABNORMAL /HPF
GFR AFRICAN AMERICAN: >60
GFR NON-AFRICAN AMERICAN: >60 ML/MIN/1.73
GLUCOSE BLD-MCNC: 125 MG/DL (ref 74–99)
GLUCOSE URINE: NEGATIVE MG/DL
HCG, URINE, POC: NEGATIVE
HCT VFR BLD CALC: 34.8 % (ref 34–48)
HEMOGLOBIN: 10.6 G/DL (ref 11.5–15.5)
KETONES, URINE: NEGATIVE MG/DL
LACTIC ACID, SEPSIS: 1 MMOL/L (ref 0.5–1.9)
LEUKOCYTE ESTERASE, URINE: NEGATIVE
LIPASE: 32 U/L (ref 13–60)
Lab: NORMAL
MCH RBC QN AUTO: 26.2 PG (ref 26–35)
MCHC RBC AUTO-ENTMCNC: 30.5 % (ref 32–34.5)
MCV RBC AUTO: 86.1 FL (ref 80–99.9)
NEGATIVE QC PASS/FAIL: NORMAL
NITRITE, URINE: NEGATIVE
PDW BLD-RTO: 13.5 FL (ref 11.5–15)
PH UA: 7 (ref 5–9)
PLATELET # BLD: 295 E9/L (ref 130–450)
PMV BLD AUTO: 9.8 FL (ref 7–12)
POSITIVE QC PASS/FAIL: NORMAL
POTASSIUM SERPL-SCNC: 4 MMOL/L (ref 3.5–5)
PROTEIN UA: NEGATIVE MG/DL
RBC # BLD: 4.04 E12/L (ref 3.5–5.5)
RBC UA: ABNORMAL /HPF (ref 0–2)
SARS-COV-2, NAAT: NOT DETECTED
SODIUM BLD-SCNC: 136 MMOL/L (ref 132–146)
SPECIFIC GRAVITY UA: 1.01 (ref 1–1.03)
TOTAL PROTEIN: 7.6 G/DL (ref 6.4–8.3)
UROBILINOGEN, URINE: 0.2 E.U./DL
WBC # BLD: 6.8 E9/L (ref 4.5–11.5)
WBC UA: ABNORMAL /HPF (ref 0–5)

## 2021-05-03 PROCEDURE — 2580000003 HC RX 258: Performed by: EMERGENCY MEDICINE

## 2021-05-03 PROCEDURE — 6360000004 HC RX CONTRAST MEDICATION: Performed by: RADIOLOGY

## 2021-05-03 PROCEDURE — 85027 COMPLETE CBC AUTOMATED: CPT

## 2021-05-03 PROCEDURE — 81001 URINALYSIS AUTO W/SCOPE: CPT

## 2021-05-03 PROCEDURE — 83605 ASSAY OF LACTIC ACID: CPT

## 2021-05-03 PROCEDURE — 87635 SARS-COV-2 COVID-19 AMP PRB: CPT

## 2021-05-03 PROCEDURE — 74177 CT ABD & PELVIS W/CONTRAST: CPT

## 2021-05-03 PROCEDURE — 80053 COMPREHEN METABOLIC PANEL: CPT

## 2021-05-03 PROCEDURE — 83690 ASSAY OF LIPASE: CPT

## 2021-05-03 PROCEDURE — 99285 EMERGENCY DEPT VISIT HI MDM: CPT

## 2021-05-03 RX ORDER — SODIUM CHLORIDE 0.9 % (FLUSH) 0.9 %
10 SYRINGE (ML) INJECTION ONCE
Status: DISCONTINUED | OUTPATIENT
Start: 2021-05-03 | End: 2021-05-04 | Stop reason: HOSPADM

## 2021-05-03 RX ORDER — SODIUM CHLORIDE 9 MG/ML
INJECTION, SOLUTION INTRAVENOUS CONTINUOUS
Status: DISCONTINUED | OUTPATIENT
Start: 2021-05-03 | End: 2021-05-04 | Stop reason: HOSPADM

## 2021-05-03 RX ADMIN — SODIUM CHLORIDE: 9 INJECTION, SOLUTION INTRAVENOUS at 22:35

## 2021-05-03 RX ADMIN — IOPAMIDOL 90 ML: 755 INJECTION, SOLUTION INTRAVENOUS at 22:31

## 2021-05-03 NOTE — ED TRIAGE NOTES
FIRST PROVIDER CONTACT ASSESSMENT NOTE      Department of Emergency Medicine   ED  First Provider Note   5/3/21  6:21 PM EDT    Chief Complaint: Emesis (NV all day, cannot keep anything down , states hernia surgery 2 months ago )      History of Present Illness:    Diann Pinto is a 41 y.o. female who presents to the ED by private car for nausea emesis  Focused Screening Exam:  Constitutional:  Alert, appears stated age and is in no distress.       *ALLERGIES*     Hydroxyzine, Amoxicillin-pot clavulanate, Cefdinir, Duloxetine, Gabapentin, Naltrexone, Nitrofuran derivatives, Nuts [peanut-containing drug products], Omeprazole, Phenazopyridine, Prednisone, Ranitidine, Reglan [metoclopramide], Sulfamethizole, Tramadol, and Venlafaxine     ED Triage Vitals   BP Temp Temp Source Pulse Resp SpO2 Height Weight   05/03/21 1757 05/03/21 1718 05/03/21 1718 05/03/21 1718 05/03/21 1718 05/03/21 1718 -- --   (!) 121/94 97.3 °F (36.3 °C) Infrared 124 21 98 %          Initial Plan of Care:  Initiate Treatment-Testing, Proceed toTreatment Area When Bed Available for ED Attending/MLP to Continue Care    -----------------END OF FIRST PROVIDER CONTACT ASSESSMENT NOTE--------------  Electronically signed by GURINDER Collier CNP   DD: 5/3/21

## 2021-05-04 VITALS
SYSTOLIC BLOOD PRESSURE: 128 MMHG | OXYGEN SATURATION: 100 % | RESPIRATION RATE: 18 BRPM | HEART RATE: 79 BPM | TEMPERATURE: 97.3 F | DIASTOLIC BLOOD PRESSURE: 70 MMHG

## 2021-05-04 NOTE — ED NOTES
Patient A&OX4, respirations non-labored, skin warm/dry, no distress noted. Patient discharged per order.       Pati Nuñez RN  05/04/21 0507

## 2021-05-04 NOTE — ED NOTES
Received report from Bailey Donovan, [facility].        Jerry Carter RN  05/03/21 3264

## 2021-05-04 NOTE — ED NOTES
Patient drinking water, had a episode where she claimed she had some gas and spit out some bubbles. Patient is drinking more water and hasnt had any emesis since at this time.      Diann Larson RN  05/03/21 0880

## 2021-05-04 NOTE — ED PROVIDER NOTES
HPI:  5/4/21, Time: 1:03 AM EDT         Tabby Cheung is a 41 y.o. female presenting to the ED for abdominal pain. Patient states she has been having nausea and vomiting since morning. She cannot keep anything down. Came on suddenly, nothing makes it better or worse, no associated pain. She says she eats and vomits immediately. She says she is status post hiatal hernia repair about 2 months ago. She attempted to contact her surgeon, was not able to, came in for evaluation. She denies any active pain now. She denies any fever, chills, change in bowel or bladder, neck pain or stiffness, lethargy, cough, sputum, chest pain, shortness of breath, or any other symptoms or complaints. Review of Systems:   A complete review of systems was performed and pertinent positives and negatives are stated within HPI, all other systems reviewed and are negative.          --------------------------------------------- PAST HISTORY ---------------------------------------------  Past Medical History:  has a past medical history of Anxiety, Asthma, Carotid stenosis, right, Chest pain, Complex regional pain syndrome, CRPS (complex regional pain syndrome type II), Depression, Discoloration of skin of hand, Environmental allergies, Fibromyalgia, GERD (gastroesophageal reflux disease), Spinal stenosis, Sprained ankle, and Weakness of right arm. Past Surgical History:  has a past surgical history that includes [place] tooth extraction; Colonoscopy (2013?); Upper gastrointestinal endoscopy (2013?); Upper gastrointestinal endoscopy (N/A, 2/12/2021); esophageal motility study (N/A, 2/12/2021); and hiatal hernia repair (N/A, 3/5/2021). Social History:  reports that she quit smoking about 3 years ago. Her smoking use included cigarettes. She started smoking about 31 years ago. She has never used smokeless tobacco. She reports that she does not drink alcohol or use drugs.     Family History: family history includes Elevated Lipids in her mother; Heart Disease in her father; Heart Failure in her mother. The patients home medications have been reviewed.     Allergies: Hydroxyzine, Amoxicillin-pot clavulanate, Cefdinir, Duloxetine, Gabapentin, Naltrexone, Nitrofuran derivatives, Nuts [peanut-containing drug products], Omeprazole, Phenazopyridine, Prednisone, Ranitidine, Reglan [metoclopramide], Sulfamethizole, Tramadol, and Venlafaxine    -------------------------------------------------- RESULTS -------------------------------------------------  All laboratory and radiology results have been personally reviewed by myself   LABS:  Results for orders placed or performed during the hospital encounter of 05/03/21   COVID-19, Rapid    Specimen: Nasopharyngeal Swab   Result Value Ref Range    SARS-CoV-2, NAAT Not Detected Not Detected   CBC   Result Value Ref Range    WBC 6.8 4.5 - 11.5 E9/L    RBC 4.04 3.50 - 5.50 E12/L    Hemoglobin 10.6 (L) 11.5 - 15.5 g/dL    Hematocrit 34.8 34.0 - 48.0 %    MCV 86.1 80.0 - 99.9 fL    MCH 26.2 26.0 - 35.0 pg    MCHC 30.5 (L) 32.0 - 34.5 %    RDW 13.5 11.5 - 15.0 fL    Platelets 520 001 - 864 E9/L    MPV 9.8 7.0 - 12.0 fL   Comprehensive Metabolic Panel   Result Value Ref Range    Sodium 136 132 - 146 mmol/L    Potassium 4.0 3.5 - 5.0 mmol/L    Chloride 103 98 - 107 mmol/L    CO2 25 22 - 29 mmol/L    Anion Gap 8 7 - 16 mmol/L    Glucose 125 (H) 74 - 99 mg/dL    BUN 9 6 - 20 mg/dL    CREATININE 0.7 0.5 - 1.0 mg/dL    GFR Non-African American >60 >=60 mL/min/1.73    GFR African American >60     Calcium 9.6 8.6 - 10.2 mg/dL    Total Protein 7.6 6.4 - 8.3 g/dL    Albumin 4.5 3.5 - 5.2 g/dL    Total Bilirubin 0.3 0.0 - 1.2 mg/dL    Alkaline Phosphatase 53 35 - 104 U/L    ALT 7 0 - 32 U/L    AST 16 0 - 31 U/L   Lactate, Sepsis   Result Value Ref Range    Lactic Acid, Sepsis 1.0 0.5 - 1.9 mmol/L   Lipase   Result Value Ref Range    Lipase 32 13 - 60 U/L   Urinalysis with Microscopic   Result Value Ref Range    Color, UA Yellow Straw/Yellow    Clarity, UA Clear Clear    Glucose, Ur Negative Negative mg/dL    Bilirubin Urine Negative Negative    Ketones, Urine Negative Negative mg/dL    Specific Gravity, UA 1.010 1.005 - 1.030    Blood, Urine SMALL (A) Negative    pH, UA 7.0 5.0 - 9.0    Protein, UA Negative Negative mg/dL    Urobilinogen, Urine 0.2 <2.0 E.U./dL    Nitrite, Urine Negative Negative    Leukocyte Esterase, Urine Negative Negative    WBC, UA 0-1 0 - 5 /HPF    RBC, UA 0-1 0 - 2 /HPF    Epithelial Cells, UA FEW /HPF    Bacteria, UA FEW (A) None Seen /HPF   POC Pregnancy Urine Qual   Result Value Ref Range    HCG, Urine, POC Negative Negative    Lot Number [id number]     Positive QC Pass/Fail Pass     Negative QC Pass/Fail Pass        RADIOLOGY:  Interpreted by Radiologist.  CT ABDOMEN PELVIS W IV CONTRAST Additional Contrast? None   Final Result   Postsurgical changes at the GE junction, status post hiatal hernia repair. Constipation. No evidence of acute intra-abdominal or pelvic inflammatory process. Mild free fluid.             ------------------------- NURSING NOTES AND VITALS REVIEWED ---------------------------   The nursing notes within the ED encounter and vital signs as below have been reviewed. BP (!) 121/94   Pulse 106   Temp 97.3 °F (36.3 °C) (Infrared)   Resp 21   LMP 04/30/2021   SpO2 100%   Oxygen Saturation Interpretation: Normal      ---------------------------------------------------PHYSICAL EXAM--------------------------------------      Constitutional/General: Alert and oriented x3, well appearing, non toxic in NAD  Head: Normocephalic and atraumatic  Eyes: PERRL, EOMI  Mouth: Oropharynx clear, handling secretions, no trismus  Neck: Supple, full ROM,   Pulmonary: Lungs clear to auscultation bilaterally, no wheezes, rales, or rhonchi. Not in respiratory distress  Cardiovascular:  Regular rate and rhythm, no murmurs, gallops, or rubs.  2+ distal pulses  Abdomen: Soft, non tender, non distended, no guarding or rebound, bowel sounds normal  Extremities: Moves all extremities x 4. Warm and well perfused  Skin: warm and dry without rash  Neurologic: GCS 15,  Psych: Normal Affect      ------------------------------ ED COURSE/MEDICAL DECISION MAKING----------------------  Medications   0.9 % sodium chloride infusion ( Intravenous New Bag 5/3/21 2235)   sodium chloride flush 0.9 % injection 10 mL (has no administration in time range)   iopamidol (ISOVUE-370) 76 % injection 90 mL (90 mLs Intravenous Given 5/3/21 2231)         ED COURSE:       Medical Decision Making:    Labs and imaging reviewed. Reevaluation, patient's resting comfortably. Tolerating p.o. She is able to eat a sandwich. Repeat abdominal examination does not indicate a surgical him, she has no pain. Given this, along with his findings, did not feel that further emergent evaluation was indicated. Patient was discharged. Patient is to follow-up with her surgeon in 1 to 2 days. Patient is to have a repeat abdominal examination on the emergent basis if new or worsening symptoms arise. Counseling: The emergency provider has spoken with the patient and discussed todays results, in addition to providing specific details for the plan of care and counseling regarding the diagnosis and prognosis. Questions are answered at this time and they are agreeable with the plan.      --------------------------------- IMPRESSION AND DISPOSITION ---------------------------------    IMPRESSION  1. Nausea and vomiting, intractability of vomiting not specified, unspecified vomiting type        DISPOSITION  Disposition: Discharge to home  Patient condition is stable      NOTE: This report was transcribed using voice recognition software.  Every effort was made to ensure accuracy; however, inadvertent computerized transcription errors may be present        Mata Spears MD  05/07/21 5443

## 2021-05-06 ENCOUNTER — VIRTUAL VISIT (OUTPATIENT)
Dept: SURGERY | Age: 46
End: 2021-05-06

## 2021-05-06 DIAGNOSIS — R13.14 PHARYNGOESOPHAGEAL DYSPHAGIA: Primary | ICD-10-CM

## 2021-05-06 PROCEDURE — 99024 POSTOP FOLLOW-UP VISIT: CPT | Performed by: SURGERY

## 2021-05-06 NOTE — PROGRESS NOTES
Surgery Progress Note            Chief complaint:   Chief Complaint   Patient presents with    Telephone Appointment Visit      Patient Active Problem List   Diagnosis    Pain, arm, right    Discoloration of skin of hand    Weakness of right arm    Complex regional pain syndrome    Carotid stenosis, right    Chest pain    Gastroesophageal reflux disease without esophagitis    Hiatal hernia       S: having dysphagia intermittently    O: There were no vitals filed for this visit. No intake or output data in the 24 hours ending 05/06/21 1047        Labs:  Lab Results   Component Value Date    WBC 6.8 05/03/2021    WBC 5.8 03/02/2021    WBC 5.9 09/25/2020    HGB 10.6 05/03/2021    HGB 12.3 03/02/2021    HGB 12.8 09/25/2020    HCT 34.8 05/03/2021    HCT 38.1 03/02/2021    HCT 40.9 09/25/2020     Lab Results   Component Value Date    CREATININE 0.7 05/03/2021    BUN 9 05/03/2021     05/03/2021    K 4.0 05/03/2021     05/03/2021    CO2 25 05/03/2021     Lab Results   Component Value Date    LIPASE 32 05/03/2021    LIPASE 27 12/19/2018    LIPASE 26 05/10/2018    AMYLASE 51 09/25/2020         Physical exam:   Not done due virtual visit    A:  Dysphagia s/p hhr and linx     P: will do egd if symptoms present again but states feeling better now    Shannon Power MD  5/6/2021  Consent:  He and/or health care decision maker is aware that that he may receive a bill for this telephone service, depending on his insurance coverage, and has provided verbal consent to proceed: Yes      Documentation:  I communicated with the patient and/or health care decision maker about recent ED visit. Details of this discussion including any medical advice provided: plan if symptoms recur      I affirm his is a Patient Initiated Episode with a Patient who has not had a related appointment within my department in the past 7 days or scheduled within the next 24 hours.         Patient's location: home address  Physician location office address in [facility]  Other people involved in call none          Total Time: minutes: 11-20 minutes

## 2021-05-17 ENCOUNTER — IMMUNIZATION (OUTPATIENT)
Dept: PRIMARY CARE CLINIC | Age: 46
End: 2021-05-17
Payer: MEDICAID

## 2021-05-17 PROCEDURE — 0002A COVID-19, PFIZER VACCINE 30MCG/0.3ML DOSE: CPT | Performed by: NURSE PRACTITIONER

## 2021-05-17 PROCEDURE — 91300 COVID-19, PFIZER VACCINE 30MCG/0.3ML DOSE: CPT | Performed by: NURSE PRACTITIONER

## 2021-07-20 ENCOUNTER — TELEPHONE (OUTPATIENT)
Dept: SURGERY | Age: 46
End: 2021-07-20

## 2021-07-20 NOTE — TELEPHONE ENCOUNTER
Patient provided with surgery instructions during office visit. Patient scheduled for follow up visit with Dr. Edison Peralta on 08/02/2021. Surgery scheduling form faxed and confirmation received.     Electronically signed by Trice Story MA on 7/20/2021 at 2:15 PM

## 2021-07-20 NOTE — TELEPHONE ENCOUNTER
Prior Authorization Form:      DEMOGRAPHICS:                     Patient Name:  Anup Figueroa  Patient :  1975            Insurance:  Payor: Reyna Mike / Plan: Yocasta Loving / Product Type: *No Product type* /   Insurance ID Number:    Payor/Plan Subscr  Sex Relation Sub. Ins. ID Effective Group Num   1.  77 [facility] 1975 Female Self [id number]                                   [address] [address]         DIAGNOSIS & PROCEDURE:                       Procedure/Operation: EGD           CPT Code: 54187    Diagnosis:  dysphagia    ICD10 Code: R13.14    Location:  [address] [place] [address]    Surgeon:  Jomar Santizo INFORMATION:                          Date: 2021    Time:               Anesthesia:                                                         Status:  Outpatient        Special Comments:           Electronically signed by Cristina Martins MA on 2021 at 2:14 PM

## 2021-07-21 ENCOUNTER — PREP FOR PROCEDURE (OUTPATIENT)
Dept: SURGERY | Age: 46
End: 2021-07-21

## 2021-07-21 RX ORDER — SODIUM CHLORIDE, SODIUM LACTATE, POTASSIUM CHLORIDE, CALCIUM CHLORIDE 600; 310; 30; 20 MG/100ML; MG/100ML; MG/100ML; MG/100ML
INJECTION, SOLUTION INTRAVENOUS CONTINUOUS
Status: CANCELLED | OUTPATIENT
Start: 2021-07-21

## 2021-07-27 ENCOUNTER — ANESTHESIA (OUTPATIENT)
Dept: ENDOSCOPY | Age: 46
End: 2021-07-27
Payer: MEDICAID

## 2021-07-27 ENCOUNTER — ANESTHESIA EVENT (OUTPATIENT)
Dept: ENDOSCOPY | Age: 46
End: 2021-07-27
Payer: MEDICAID

## 2021-07-27 ENCOUNTER — HOSPITAL ENCOUNTER (OUTPATIENT)
Age: 46
Setting detail: OUTPATIENT SURGERY
Discharge: HOME | End: 2021-07-27
Attending: SURGERY | Admitting: SURGERY
Payer: MEDICAID

## 2021-07-27 VITALS
OXYGEN SATURATION: 100 % | BODY MASS INDEX: 17.52 KG/M2 | DIASTOLIC BLOOD PRESSURE: 55 MMHG | HEIGHT: 67 IN | HEART RATE: 69 BPM | TEMPERATURE: 97 F | SYSTOLIC BLOOD PRESSURE: 105 MMHG | RESPIRATION RATE: 16 BRPM | WEIGHT: 111.6 LBS

## 2021-07-27 VITALS — SYSTOLIC BLOOD PRESSURE: 123 MMHG | DIASTOLIC BLOOD PRESSURE: 75 MMHG | OXYGEN SATURATION: 100 %

## 2021-07-27 LAB — HCG(URINE) PREGNANCY TEST: NEGATIVE

## 2021-07-27 PROCEDURE — 3700000001 HC ADD 15 MINUTES (ANESTHESIA): Performed by: SURGERY

## 2021-07-27 PROCEDURE — 3700000000 HC ANESTHESIA ATTENDED CARE: Performed by: SURGERY

## 2021-07-27 PROCEDURE — 3609017700 HC EGD DILATION GASTRIC/DUODENAL STRICTURE: Performed by: SURGERY

## 2021-07-27 PROCEDURE — 2709999900 HC NON-CHARGEABLE SUPPLY: Performed by: SURGERY

## 2021-07-27 PROCEDURE — 2580000003 HC RX 258: Performed by: NURSE ANESTHETIST, CERTIFIED REGISTERED

## 2021-07-27 PROCEDURE — 7100000010 HC PHASE II RECOVERY - FIRST 15 MIN: Performed by: SURGERY

## 2021-07-27 PROCEDURE — 43249 ESOPH EGD DILATION <30 MM: CPT | Performed by: SURGERY

## 2021-07-27 PROCEDURE — 88305 TISSUE EXAM BY PATHOLOGIST: CPT

## 2021-07-27 PROCEDURE — 7100000011 HC PHASE II RECOVERY - ADDTL 15 MIN: Performed by: SURGERY

## 2021-07-27 PROCEDURE — 81025 URINE PREGNANCY TEST: CPT

## 2021-07-27 PROCEDURE — 6360000002 HC RX W HCPCS: Performed by: ANESTHESIOLOGY

## 2021-07-27 PROCEDURE — 3609012400 HC EGD TRANSORAL BIOPSY SINGLE/MULTIPLE: Performed by: SURGERY

## 2021-07-27 PROCEDURE — 99024 POSTOP FOLLOW-UP VISIT: CPT | Performed by: SURGERY

## 2021-07-27 RX ORDER — SODIUM CHLORIDE, SODIUM LACTATE, POTASSIUM CHLORIDE, CALCIUM CHLORIDE 600; 310; 30; 20 MG/100ML; MG/100ML; MG/100ML; MG/100ML
INJECTION, SOLUTION INTRAVENOUS CONTINUOUS
Status: DISCONTINUED | OUTPATIENT
Start: 2021-07-27 | End: 2021-07-27 | Stop reason: HOSPADM

## 2021-07-27 RX ORDER — SODIUM CHLORIDE 0.9 % (FLUSH) 0.9 %
5-40 SYRINGE (ML) INJECTION PRN
Status: DISCONTINUED | OUTPATIENT
Start: 2021-07-27 | End: 2021-07-27 | Stop reason: HOSPADM

## 2021-07-27 RX ORDER — PROPOFOL 10 MG/ML
INJECTION, EMULSION INTRAVENOUS PRN
Status: DISCONTINUED | OUTPATIENT
Start: 2021-07-27 | End: 2021-07-27 | Stop reason: SDUPTHER

## 2021-07-27 RX ORDER — SODIUM CHLORIDE, SODIUM LACTATE, POTASSIUM CHLORIDE, CALCIUM CHLORIDE 600; 310; 30; 20 MG/100ML; MG/100ML; MG/100ML; MG/100ML
INJECTION, SOLUTION INTRAVENOUS CONTINUOUS PRN
Status: DISCONTINUED | OUTPATIENT
Start: 2021-07-27 | End: 2021-07-27 | Stop reason: SDUPTHER

## 2021-07-27 RX ADMIN — PROPOFOL 100 MG: 10 INJECTION, EMULSION INTRAVENOUS at 07:57

## 2021-07-27 RX ADMIN — PROPOFOL 50 MG: 10 INJECTION, EMULSION INTRAVENOUS at 08:03

## 2021-07-27 RX ADMIN — SODIUM CHLORIDE, POTASSIUM CHLORIDE, SODIUM LACTATE AND CALCIUM CHLORIDE: 600; 310; 30; 20 INJECTION, SOLUTION INTRAVENOUS at 07:35

## 2021-07-27 NOTE — ANESTHESIA PRE PROCEDURE
Department of Anesthesiology  Preprocedure Note       Name:  Chidi Don   Age:  41 y.o.  :  1975                                          MRN:  [id number]         Date:  2021      Surgeon: Anjali Jara):  Mary Rojo MD    Procedure: Procedure(s):  EGD ESOPHAGOGASTRODUODENOSCOPY    Medications prior to admission:   Prior to Admission medications    Medication Sig Start Date End Date Taking? Authorizing Provider   polyethylene glycol (GLYCOLAX) 17 g packet Take 17 g by mouth daily 2 capfuls   Yes Historical Provider, MD   senna (SENOKOT) 8.6 MG tablet Take 2 tablets by mouth daily as needed for Constipation   Yes Historical Provider, MD   diphenhydrAMINE HCl (BENADRYL PO) Take 25 mg by mouth 2 times daily allergies   Yes Historical Provider, MD   albuterol sulfate  (90 Base) MCG/ACT inhaler Inhale 2 puffs into the lungs every 4 hours as needed  21  Yes Historical Provider, MD   ibuprofen (ADVIL;MOTRIN) 100 MG/5ML suspension Take 400 mg by mouth every 4 hours as needed  21  Yes Historical Provider, MD   sertraline (ZOLOFT) 20 MG/ML concentrated solution Take 20 mg by mouth nightly    Yes Historical Provider, MD       Current medications:    Current Facility-Administered Medications   Medication Dose Route Frequency Provider Last Rate Last Admin    sodium chloride flush 0.9 % injection 5-40 mL  5-40 mL Intravenous PRN Licha De La Torre MD        lactated ringers infusion   Intravenous Continuous Mary Rojo MD           Allergies:     Allergies   Allergen Reactions    Hydroxyzine     Amoxicillin-Pot Clavulanate      Tremors in legs    Cefdinir      Tremors in legs    Duloxetine Other (See Comments)     Hands go numb    Gabapentin Other (See Comments)     hallucinations    Naltrexone     Nitrofuran Derivatives     Nuts [Peanut-Containing Drug Products]     Omeprazole     Phenazopyridine     Prednisone Other (See Comments)     \"space out\"    Ranitidine Other (See Comments) Leg Tremors    Reglan [Metoclopramide]     Sulfamethizole     Tramadol Nausea Only    Venlafaxine        Problem List:    Patient Active Problem List   Diagnosis Code    Pain, arm, right M79.601    Discoloration of skin of hand L81.9    Weakness of right arm R29.898    Complex regional pain syndrome [id number]    Carotid stenosis, right I65.21    Chest pain R07.9    Gastroesophageal reflux disease without esophagitis K21.9    Hiatal hernia K44.9       Past Medical History:        Diagnosis Date    Anxiety     Asthma     Carotid stenosis, right 05/30/2018    30-40%    Chest pain 6/21/2018    Complex regional pain syndrome 2018    RIGHT UPPER EXT; [facility]    CRPS (complex regional pain syndrome type II)     Depression     Discoloration of skin of hand 6/8/2017    Environmental allergies     Fibromyalgia     GERD (gastroesophageal reflux disease)     Spinal stenosis     Sprained ankle     Weakness of right arm 6/8/2017       Past Surgical History:        Procedure Laterality Date    COLONOSCOPY  2013? Negative findings    ESOPHAGEAL MOTILITY STUDY N/A 2/12/2021    ESOPHAGEAL MOTILITY/MANOMETRY STUDY performed by Matilda Freeman DO at [place] [id number] N/A 3/5/2021    LAPAROSCOPIC HIATAL HERNIA REPAIR WITH MESH performed by Michelle Burns MD at [address] [place] [address]  2013? GERD    UPPER GASTROINTESTINAL ENDOSCOPY N/A 2/12/2021    EGD BIOPSY performed by Michelle Burns MD at [place] [address] EXTRACTION         Social History:    Social History     Tobacco Use    Smoking status: Former Smoker     Types: Cigarettes     Start date: 1990     Quit date: 9/19/2017     Years since quitting: 3.8    Smokeless tobacco: Never Used   Substance Use Topics    Alcohol use:  No                                Counseling given: Not Answered      Vital Signs (Current):   Vitals:    07/26/21 1049 07/27/21 0700   BP:  112/75 Pulse:  77   Resp:  16   Temp:  97.5 °F (36.4 °C)   TempSrc:  Infrared   SpO2:  100%   Weight: 111 lb (50.3 kg) 111 lb 9.6 oz (50.6 kg)   Height: 5' 7\" (1.702 m) 5' 7\" (1.702 m)                                              BP Readings from Last 3 Encounters:   07/27/21 112/75   05/04/21 128/70   03/18/21 111/75       NPO Status:                                                                                 BMI:   Wt Readings from Last 3 Encounters:   07/27/21 111 lb 9.6 oz (50.6 kg)   03/18/21 118 lb (53.5 kg)   03/11/21 118 lb (53.5 kg)     Body mass index is 17.48 kg/m². CBC:   Lab Results   Component Value Date    WBC 6.8 05/03/2021    RBC 4.04 05/03/2021    HGB 10.6 05/03/2021    HCT 34.8 05/03/2021    MCV 86.1 05/03/2021    RDW 13.5 05/03/2021     05/03/2021       CMP:   Lab Results   Component Value Date     05/03/2021    K 4.0 05/03/2021    K 4.3 03/02/2021     05/03/2021    CO2 25 05/03/2021    BUN 9 05/03/2021    CREATININE 0.7 05/03/2021    GFRAA >60 05/03/2021    LABGLOM >60 05/03/2021    GLUCOSE 125 05/03/2021    PROT 7.6 05/03/2021    CALCIUM 9.6 05/03/2021    BILITOT 0.3 05/03/2021    ALKPHOS 53 05/03/2021    AST 16 05/03/2021    ALT 7 05/03/2021       POC Tests: No results for input(s): POCGLU, POCNA, POCK, POCCL, POCBUN, POCHEMO, POCHCT in the last 72 hours.     Coags: No results found for: PROTIME, INR, APTT    HCG (If Applicable):   Lab Results   Component Value Date    PREGTESTUR NEGATIVE 03/05/2021        ABGs: No results found for: PHART, PO2ART, OVE8DCM, FJO2RGE, BEART, H7KSMXUI     Type & Screen (If Applicable):  No results found for: LABABO, LABRH    Drug/Infectious Status (If Applicable):  No results found for: HIV, HEPCAB    COVID-19 Screening (If Applicable):   Lab Results   Component Value Date    COVID19 Not Detected 05/03/2021    COVID19 Not Detected 03/01/2021    COVID19 Not Detected 02/09/2021           Anesthesia Evaluation  Patient summary reviewed  Airway: Mallampati: II  TM distance: >3 FB     Mouth opening: > = 3 FB Dental: normal exam         Pulmonary:   (+) asthma:                            Cardiovascular:Negative CV ROS            Rhythm: regular  Rate: normal                    Neuro/Psych:   (+) neuromuscular disease:, psychiatric history:depression/anxiety             GI/Hepatic/Renal:   (+) hiatal hernia, GERD:,           Endo/Other: Negative Endo/Other ROS                    Abdominal:       Abdomen: soft. Vascular: Other Findings:             Anesthesia Plan      MAC     ASA 2       Induction: intravenous. Anesthetic plan and risks discussed with patient. Plan discussed with CRNA.                   Valentine Hashimoto, MD   7/27/2021

## 2021-07-27 NOTE — PROGRESS NOTES
SBAR form completed and placed on chart. Chart with patient in transit.
not wear any makeup (including no eye makeup) or nail polish on your fingers or toes. 11. DO NOT wear any jewelry or piercings on day of surgery. All body piercing jewelry must be removed. 12. Shower the night before surgery with __x_Antibacterial soap /SID WIPES________    13. TOTAL JOINT REPLACEMENT/HYSTERECTOMY PATIENTS ONLY---Remember to bring Blood Bank bracelet to the hospital on the day of surgery. 14. If you have a Living Will and Durable Power of  for Healthcare, please bring in a copy. 15. If appropriate bring crutches, inspirex, WALKER, CANE etc... 12. Notify your Surgeon if you develop any illness between now and surgery time, cough, cold, fever, sore throat, nausea, vomiting, etc.  Please notify your surgeon if you experience dizziness, shortness of breath or blurred vision between now & the time of your surgery. 17. If you have ___dentures, they will be removed before going to the OR; we will provide you a container. If you wear ___contact lenses or ___glasses, they will be removed; please bring a case for them. 18. To provide excellent care visitors will be limited to 2 in the room at any given time. 19. Please bring picture ID and insurance card. 20. Sleep apnea patients need to bring CPAP AND SETTINGS to hospital on day of surgery. 21. During flu season no children under the age of 15 are permitted in the hospital for the safety of all patients. 22. Other                  Please call AMBULATORY CARE if you have any further questions.    [address] [facility]     [address]

## 2021-07-27 NOTE — H&P
General Surgery History and Physical    Patient's Name/Date of Birth: Farida Martinez / 1975    Date: July 27, 2021     Surgeon: Israel Le M.D.    PCP: Berta Juarez DO     Chief Complaint: dyspyhagia    HPI:   Farida Martinez is a 41 y.o. female who presents for evaluation of dysphagia s/p HHR with LINX. Past Medical History:   Diagnosis Date    Anxiety     Asthma     Carotid stenosis, right 05/30/2018    30-40%    Chest pain 6/21/2018    Complex regional pain syndrome 2018    RIGHT [facility]PPER EXT; [facility]    CRPS (complex regional pain syndrome type II)     Depression     Discoloration of skin of hand 6/8/2017    Environmental allergies     Fibromyalgia     GERD (gastroesophageal reflux disease)     Spinal stenosis     Sprained ankle     Weakness of right arm 6/8/2017       Past Surgical History:   Procedure Laterality Date    COLONOSCOPY  2013? Negative findings    ESOPHAGEAL MOTILITY STUDY N/A 2/12/2021    ESOPHAGEAL MOTILITY/MANOMETRY STUDY performed by Clementina Pena DO at [place] [address] N/A 3/5/2021    LAPAROSCOPIC HIATAL HERNIA REPAIR WITH MESH performed by Maddison Flowers MD at [facility]  2013?     GERD    UPPER GASTROINTESTINAL ENDOSCOPY N/A 2/12/2021    EGD BIOPSY performed by Maddison Flowers MD at [address]         Current Facility-Administered Medications   Medication Dose Route Frequency Provider Last Rate Last Admin    sodium chloride flush 0.9 % injection 5-40 mL  5-40 mL Intravenous PRN Hines Osler, MD        lactated ringers infusion   Intravenous Continuous Maddison Flowers MD           Allergies   Allergen Reactions    Hydroxyzine     Amoxicillin-Pot Clavulanate      Tremors in legs    Cefdinir      Tremors in legs    Duloxetine Other (See Comments)     Hands go numb    Gabapentin Other (See Comments)     hallucinations    Naltrexone     Nitrofuran Derivatives     Nuts [Peanut-Containing Drug Products]     Omeprazole     Phenazopyridine     Prednisone Other (See Comments)     \"space out\"    Ranitidine Other (See Comments)     Leg Tremors    Reglan [Metoclopramide]     Sulfamethizole     Tramadol Nausea Only    Venlafaxine        The patient has a family history that is negative for severe cardiovascular or respiratory issues, negative for reaction to anesthesia. Social History     Socioeconomic History    Marital status:      Spouse name: Not on file    Number of children: Not on file    Years of education: Not on file    Highest education level: Not on file   Occupational History    Not on file   Tobacco Use    Smoking status: Former Smoker     Types: Cigarettes     Start date: 200     Quit date: 9/19/2017     Years since quitting: 3.8    Smokeless tobacco: Never Used   Vaping Use    Vaping Use: Never used   Substance and Sexual Activity    Alcohol use: No    Drug use: No    Sexual activity: Not Currently   Other Topics Concern    Not on file   Social History Narrative    Not on file     Social Determinants of Health     Financial Resource Strain:     Difficulty of Paying Living Expenses:    Food Insecurity:     Worried About Running Out of Food in the Last Year:     [address] in the Last Year:    Transportation Needs:     Lack of Transportation (Medical):      Lack of Transportation (Non-Medical):    Physical Activity:     Days of Exercise per Week:     Minutes of Exercise per Session:    Stress:     Feeling of Stress :    Social Connections:     Frequency of Communication with Friends and Family:     Frequency of Social Gatherings with Friends and Family:     Attends [employer] Services:     Active Member of Clubs or Organizations:     Attends Club or Organization Meetings:     Marital Status:    Intimate Partner Violence:     Fear of Current or Ex-Partner:     Emotionally Abused:    

## 2021-07-27 NOTE — ANESTHESIA POSTPROCEDURE EVALUATION
Department of Anesthesiology  Postprocedure Note    Patient: Radha Dimas  MRN: [id number]  YOB: 1975  Date of evaluation: 7/27/2021  Time:  8:12 AM     Procedure Summary     Date: 07/27/21 Room / Location: [address] [place] [address] 01 / [address] [place] [address]    Anesthesia Start: 2781 Anesthesia Stop: 0809    Procedures:       EGD BIOPSY (N/A )      EGD DILATION BALLOON Diagnosis: (DYSPHAGIA)    Surgeons: Maryse Munoz MD Responsible Provider: Jailene Estrada MD    Anesthesia Type: MAC ASA Status: 2          Anesthesia Type: MAC    Ed Phase I: Ed Score: 10    Ed Phase II:      Last vitals: Reviewed and per EMR flowsheets.        Anesthesia Post Evaluation    Patient location during evaluation: bedside  Patient participation: complete - patient participated  Level of consciousness: awake  Pain score: 2  Airway patency: patent  Nausea & Vomiting: no nausea  Complications: no  Cardiovascular status: blood pressure returned to baseline  Respiratory status: acceptable  Hydration status: euvolemic

## 2021-07-27 NOTE — OP NOTE
SURGEON: Jimy Everett M.D. PREOPERATIVE DIAGNOSES:  dysphagia    POSTOPERATIVE DIAGNOSES: tight GE junction     OPERATION: Lwniiouq-rlbmky-ltoufqynpboi with biopsy  And balloon dilation    BLOOD LOSS: 0ML    ANESTHESIA: LMAC    COMPLICATIONS: None. OPERATIONS: The patient was placed on the table in left lateral decubitus position and sedated. Bite block was placed. A lubricated scope was easily passed into the upper esophagus which looked normal. The distal esophagus looked tight and was stretched to 20 with the CRE for one minure. The scope was passed into the stomach and retroflexed. There was no hiatal hernia. The scope was passed down toward the pylorus. The antral mucosa all looked normal. Biopsy was taken to check for H. pylori. The scope was then passed through the pylorus into the duodenal bulb which looked normal, then around to the distal duodenum which looked normal, and the scope was then withdrawn. The patient tolerated the procedure well.      Physician Signature: Electronically signed by Dr. Steffi Sandhoff

## 2021-08-02 ENCOUNTER — OFFICE VISIT (OUTPATIENT)
Dept: SURGERY | Age: 46
End: 2021-08-02
Payer: MEDICAID

## 2021-08-02 VITALS
HEART RATE: 78 BPM | WEIGHT: 111 LBS | DIASTOLIC BLOOD PRESSURE: 71 MMHG | BODY MASS INDEX: 17.42 KG/M2 | HEIGHT: 67 IN | TEMPERATURE: 98.4 F | SYSTOLIC BLOOD PRESSURE: 107 MMHG

## 2021-08-02 DIAGNOSIS — R13.14 PHARYNGOESOPHAGEAL DYSPHAGIA: Primary | ICD-10-CM

## 2021-08-02 PROCEDURE — G8419 CALC BMI OUT NRM PARAM NOF/U: HCPCS | Performed by: SURGERY

## 2021-08-02 PROCEDURE — G8427 DOCREV CUR MEDS BY ELIG CLIN: HCPCS | Performed by: SURGERY

## 2021-08-02 PROCEDURE — 99212 OFFICE O/P EST SF 10 MIN: CPT | Performed by: SURGERY

## 2021-08-02 PROCEDURE — 1036F TOBACCO NON-USER: CPT | Performed by: SURGERY

## 2021-08-02 RX ORDER — LORATADINE ORAL 5 MG/5ML
SOLUTION ORAL
COMMUNITY
Start: 2021-01-07 | End: 2021-08-02 | Stop reason: ALTCHOICE

## 2021-08-02 RX ORDER — METOCLOPRAMIDE 10 MG/1
TABLET ORAL
COMMUNITY
Start: 2020-09-25 | End: 2021-08-02 | Stop reason: ALTCHOICE

## 2021-08-02 RX ORDER — FAMOTIDINE 40 MG/5ML
POWDER, FOR SUSPENSION ORAL
COMMUNITY
Start: 2021-07-14 | End: 2021-08-02 | Stop reason: ALTCHOICE

## 2021-08-02 RX ORDER — PROMETHAZINE HYDROCHLORIDE 25 MG/1
TABLET ORAL
COMMUNITY
Start: 2020-09-24

## 2021-08-02 RX ORDER — TOPIRAMATE 25 MG/1
TABLET ORAL
COMMUNITY
Start: 2021-06-17 | End: 2021-08-02 | Stop reason: ALTCHOICE

## 2021-08-02 RX ORDER — MONTELUKAST SODIUM 10 MG/1
TABLET ORAL
COMMUNITY
Start: 2020-10-12 | End: 2021-08-02 | Stop reason: ALTCHOICE

## 2021-08-02 NOTE — PROGRESS NOTES
Surgery Progress Note            Chief complaint:   Patient Active Problem List   Diagnosis    Pain, arm, right    Discoloration of skin of hand    Weakness of right arm    Complex regional pain syndrome    Carotid stenosis, right    Chest pain    Gastroesophageal reflux disease without esophagitis    Hiatal hernia    Pharyngoesophageal dysphagia       S: doing well    O:   Vitals:    08/02/21 1319   BP: 107/71   Pulse: 78   Temp: 98.4 °F (36.9 °C)     No intake or output data in the 24 hours ending 08/02/21 1330        Labs:  No results for input(s): WBC, HGB, HCT in the last 72 hours. Invalid input(s): PLR  Lab Results   Component Value Date    CREATININE 0.7 05/03/2021    BUN 9 05/03/2021     05/03/2021    K 4.0 05/03/2021     05/03/2021    CO2 25 05/03/2021     No results for input(s): LIPASE, AMYLASE in the last 72 hours. Physical exam:   /71   Pulse 78   Temp 98.4 °F (36.9 °C) (Temporal)   Ht 5' 7\" (1.702 m)   Wt 111 lb (50.3 kg)   BMI 17.39 kg/m²   General appearance: NAD  Head: NCAT  Neck: supple, no masses  Lungs: equal chest rise bilateral  Heart: S1S2 present  Abdomen: soft, nontender, nondistended  Skin; no new lesions, incisions clean and intact  Gu: no cva tenderness  Extremities: extremities normal, atraumatic, no cyanosis or edema    A:  Post op egd with dilation s/p HHR with LINX doing well and tolerating diet. P: follow up as needed.      Angelica Jaime MD, MD  8/2/2021

## 2021-12-23 ENCOUNTER — VIRTUAL VISIT (OUTPATIENT)
Dept: SURGERY | Age: 46
End: 2021-12-23
Payer: MEDICAID

## 2021-12-23 ENCOUNTER — TELEPHONE (OUTPATIENT)
Dept: SURGERY | Age: 46
End: 2021-12-23

## 2021-12-23 DIAGNOSIS — R13.14 PHARYNGOESOPHAGEAL DYSPHAGIA: Primary | ICD-10-CM

## 2021-12-23 PROCEDURE — 99442 PR PHYS/QHP TELEPHONE EVALUATION 11-20 MIN: CPT | Performed by: SURGERY

## 2021-12-23 NOTE — TELEPHONE ENCOUNTER
Patient provided with surgery instructions during office visit. Patient scheduled for follow up visit with Dr. Sivan Borjas on 01/10/2022. Surgery scheduling form faxed and confirmation received.     Electronically signed by Emy Stephen MA on 12/23/2021 at 10:55 AM

## 2021-12-23 NOTE — TELEPHONE ENCOUNTER
Prior Authorization Form:      DEMOGRAPHICS:                     Patient Name:  Darnell Sabillon  Patient :  1975            Insurance:  Payor: Jossy Qureshi / Plan: Gordon Olvera / Product Type: *No Product type* /   Insurance ID Number:    Payor/Plan Subscr  Sex Relation Sub. Ins. ID Effective Group Num   1.  [address] 1975 Female Self [id number]                                   [address] [address]         DIAGNOSIS & PROCEDURE:                       Procedure/Operation: EGD w/ dilation           CPT Code: 69741    Diagnosis:  dysphagia    ICD10 Code: R13.14    Location:  [address] [place] [address]    Surgeon:  Ajay Cee INFORMATION:                          Date: 2021    Time:               Anesthesia:                                                         Status:  Outpatient        Special Comments:           Electronically signed by Zaki Chanel MA on 2021 at 10:55 AM

## 2021-12-23 NOTE — PROGRESS NOTES
General Surgery History and Physical    Patient's Name/Date of Birth: Cristel Bryan / 1975    Date: December 23, 2021     Surgeon: Oj Canela M.D.    PCP: Salima Melendez DO     Chief Complaint: dysphagia    HPI:   Cristel Bryan is a 41 y.o. female who presents for evaluation of dysphagia and had HHR with mesh and LINX in past with me and had egd with dilation done and it helped and symptoms are similar to predilation. Past Medical History:   Diagnosis Date    Anxiety     Asthma     Carotid stenosis, right 05/30/2018    30-40%    Chest pain 6/21/2018    Complex regional pain syndrome 2018    RIGHT UPPER EXT; [facility]    CRPS (complex regional pain syndrome type II)     Depression     Discoloration of skin of hand 6/8/2017    Environmental allergies     Fibromyalgia     GERD (gastroesophageal reflux disease)     Spinal stenosis     Sprained ankle     Weakness of right arm 6/8/2017       Past Surgical History:   Procedure Laterality Date    COLONOSCOPY  2013? Negative findings    ESOPHAGEAL MOTILITY STUDY N/A 2/12/2021    ESOPHAGEAL MOTILITY/MANOMETRY STUDY performed by Nayeli Piedra DO at [facility] N/A 3/5/2021    LAPAROSCOPIC HIATAL HERNIA REPAIR WITH MESH performed by Luis Whitten MD at [facility]  2013?     GERD    UPPER GASTROINTESTINAL ENDOSCOPY N/A 2/12/2021    EGD BIOPSY performed by Luis Whitten MD at [facility] 7/27/2021    EGD BIOPSY performed by Luis Whitten MD at [address] [facility]  7/27/2021    EGD DILATION BALLOON performed by Luis Whitten MD at [address] EXTRACTION         Current Outpatient Medications   Medication Sig Dispense Refill    vitamin D 25 MCG (1000 UT) CAPS Take by mouth      diclofenac sodium (VOLTAREN) 1 % GEL Diclofenac Sodium 1 % External Gel  Quantity: 100   Refills: 0  Start: 4-Oct-2020      promethazine (PHENERGAN) 25 MG tablet Take by mouth      polyethylene glycol (GLYCOLAX) 17 g packet Take 17 g by mouth daily 2 capfuls      senna (SENOKOT) 8.6 MG tablet Take 2 tablets by mouth daily as needed for Constipation      diphenhydrAMINE HCl (BENADRYL PO) Take 25 mg by mouth 2 times daily allergies      albuterol sulfate  (90 Base) MCG/ACT inhaler Inhale 2 puffs into the lungs every 4 hours as needed       ibuprofen (ADVIL;MOTRIN) 100 MG/5ML suspension Take 400 mg by mouth every 4 hours as needed       sertraline (ZOLOFT) 20 MG/ML concentrated solution Take 20 mg by mouth nightly        No current facility-administered medications for this visit. Allergies   Allergen Reactions    Hydroxyzine     Amoxicillin-Pot Clavulanate      Tremors in legs    Cefdinir      Tremors in legs    Duloxetine Other (See Comments)     Hands go numb    Gabapentin Other (See Comments)     hallucinations    Naltrexone     Nitrofuran Derivatives     Nuts [Peanut-Containing Drug Products]     Omeprazole     Phenazopyridine     Prednisone Other (See Comments)     \"space out\"    Ranitidine Other (See Comments)     Leg Tremors    Reglan [Metoclopramide]     Sulfamethizole     Tramadol Nausea Only    Venlafaxine        The patient has a family history that is negative for severe cardiovascular or respiratory issues, negative for reaction to anesthesia.     Social History     Socioeconomic History    Marital status:      Spouse name: Not on file    Number of children: Not on file    Years of education: Not on file    Highest education level: Not on file   Occupational History    Not on file   Tobacco Use    Smoking status: Former Smoker     Types: Cigarettes     Start date: 200     Quit date: 2017     Years since quittin.2    Smokeless tobacco: Never Used   Vaping Use    Vaping Use: Never used   Substance and Sexual Activity    Alcohol use: No    Drug use: No    Sexual activity: Not Currently   Other Topics Concern    Not on file   Social History Narrative    Not on file     Social Determinants of Health     Financial Resource Strain:     Difficulty of Paying Living Expenses: Not on file   Food Insecurity:     Worried About Running Out of Food in the Last Year: Not on file    Arin of Food in the Last Year: Not on file   Transportation Needs:     Lack of Transportation (Medical): Not on file    Lack of Transportation (Non-Medical):  Not on file   Physical Activity:     Days of Exercise per Week: Not on file    Minutes of Exercise per Session: Not on file   Stress:     Feeling of Stress : Not on file   Social Connections:     Frequency of Communication with Friends and Family: Not on file    Frequency of Social Gatherings with Friends and Family: Not on file    Attends [employer] Services: Not on file    Active Member of [address] [place] [address] or Organizations: Not on file    Attends Club or Organization Meetings: Not on file    Marital Status: Not on file   Intimate Partner Violence:     Fear of Current or Ex-Partner: Not on file    Emotionally Abused: Not on file    Physically Abused: Not on file    Sexually Abused: Not on file   Housing Stability:     Unable to Pay for Housing in the Last Year: Not on file    Number of [place] in the Last Year: Not on file    Unstable Housing in the Last Year: Not on file           Review of Systems  Review of Systems -  General ROS: negative for - chills, fatigue or malaise  ENT ROS: negative for - hearing change, nasal congestion or nasal discharge  Allergy and Immunology ROS: negative for - hives, itchy/watery eyes or nasal congestion  Hematological and Lymphatic ROS: negative for - blood clots, blood transfusions, bruising or fatigue  Endocrine ROS: negative for - malaise/lethargy, mood swings, palpitations or polydipsia/polyuria  Respiratory ROS: negative for - sputum changes, stridor, tachypnea or wheezing  Cardiovascular ROS: negative for - irregular heartbeat, loss of consciousness, murmur or orthopnea  Gastrointestinal ROS: negative for - constipation, diarrhea, gas/bloating, heartburn or hematemesis  Genito-Urinary ROS: negative for -  genital discharge, genital ulcers or hematuria  Musculoskeletal ROS: negative for - gait disturbance, muscle pain or muscular weakness    Physical exam:   Not done due to phone visit        Assessment:  41 y.o. female with dysphagia s/p HHR with LINX    Plan: Will do EGD to assess with possible dilation  Discussed the risk, benefits and alternatives of surgery including wound infections, bleeding, scar and hernia formation and the risks of general anesthetic including MI, CVA, sudden death or reactions to anesthetic medications. The patient understands the risks and alternatives and the possibility of converting to an open procedure. All questions were answered to the patient's satisfaction and they freely signed the consent. Marv Amor MD  8:42 AM  12/23/2021    Consent:  He and/or health care decision maker is aware that that he may receive a bill for this telephone service, depending on his insurance coverage, and has provided verbal consent to proceed: Yes      Documentation:  I communicated with the patient and/or health care decision maker about discussion on recurrence of dysphagia. Details of this discussion including any medical advice provided: plan for EGD with dilation      I affirm his is a Patient Initiated Episode with a Patient who has not had a related appointment within my department in the past 7 days or scheduled within the next 24 hours.         Patient's location: home address  Physician  location office address in [facility]  Other people involved in call none          Total Time: minutes: 11-20 minutes

## 2021-12-28 ENCOUNTER — ANESTHESIA EVENT (OUTPATIENT)
Dept: ENDOSCOPY | Age: 46
End: 2021-12-28
Payer: MEDICAID

## 2021-12-28 ENCOUNTER — PREP FOR PROCEDURE (OUTPATIENT)
Dept: SURGERY | Age: 46
End: 2021-12-28

## 2021-12-28 ENCOUNTER — ANESTHESIA (OUTPATIENT)
Dept: ENDOSCOPY | Age: 46
End: 2021-12-28
Payer: MEDICAID

## 2021-12-28 ENCOUNTER — HOSPITAL ENCOUNTER (OUTPATIENT)
Age: 46
Setting detail: OUTPATIENT SURGERY
Discharge: HOME | End: 2021-12-28
Attending: SURGERY | Admitting: SURGERY
Payer: MEDICAID

## 2021-12-28 VITALS
DIASTOLIC BLOOD PRESSURE: 68 MMHG | RESPIRATION RATE: 15 BRPM | OXYGEN SATURATION: 100 % | SYSTOLIC BLOOD PRESSURE: 123 MMHG

## 2021-12-28 VITALS
SYSTOLIC BLOOD PRESSURE: 120 MMHG | TEMPERATURE: 97.5 F | WEIGHT: 126 LBS | DIASTOLIC BLOOD PRESSURE: 56 MMHG | HEIGHT: 67 IN | RESPIRATION RATE: 16 BRPM | OXYGEN SATURATION: 100 % | BODY MASS INDEX: 19.78 KG/M2 | HEART RATE: 76 BPM

## 2021-12-28 LAB — HCG(URINE) PREGNANCY TEST: NEGATIVE

## 2021-12-28 PROCEDURE — 99024 POSTOP FOLLOW-UP VISIT: CPT | Performed by: SURGERY

## 2021-12-28 PROCEDURE — 7100000011 HC PHASE II RECOVERY - ADDTL 15 MIN: Performed by: SURGERY

## 2021-12-28 PROCEDURE — 2580000003 HC RX 258: Performed by: NURSE ANESTHETIST, CERTIFIED REGISTERED

## 2021-12-28 PROCEDURE — 81025 URINE PREGNANCY TEST: CPT

## 2021-12-28 PROCEDURE — 2580000003 HC RX 258: Performed by: ANESTHESIOLOGY

## 2021-12-28 PROCEDURE — 7100000010 HC PHASE II RECOVERY - FIRST 15 MIN: Performed by: SURGERY

## 2021-12-28 PROCEDURE — 6360000002 HC RX W HCPCS: Performed by: NURSE ANESTHETIST, CERTIFIED REGISTERED

## 2021-12-28 PROCEDURE — C1726 CATH, BAL DIL, NON-VASCULAR: HCPCS | Performed by: SURGERY

## 2021-12-28 PROCEDURE — 3700000000 HC ANESTHESIA ATTENDED CARE: Performed by: SURGERY

## 2021-12-28 PROCEDURE — 3700000001 HC ADD 15 MINUTES (ANESTHESIA): Performed by: SURGERY

## 2021-12-28 PROCEDURE — 43249 ESOPH EGD DILATION <30 MM: CPT | Performed by: SURGERY

## 2021-12-28 PROCEDURE — 2709999900 HC NON-CHARGEABLE SUPPLY: Performed by: SURGERY

## 2021-12-28 PROCEDURE — 3609017700 HC EGD DILATION GASTRIC/DUODENAL STRICTURE: Performed by: SURGERY

## 2021-12-28 RX ORDER — SODIUM CHLORIDE, SODIUM LACTATE, POTASSIUM CHLORIDE, CALCIUM CHLORIDE 600; 310; 30; 20 MG/100ML; MG/100ML; MG/100ML; MG/100ML
INJECTION, SOLUTION INTRAVENOUS CONTINUOUS
Status: CANCELLED | OUTPATIENT
Start: 2021-12-28

## 2021-12-28 RX ORDER — PROPOFOL 10 MG/ML
INJECTION, EMULSION INTRAVENOUS PRN
Status: DISCONTINUED | OUTPATIENT
Start: 2021-12-28 | End: 2021-12-28 | Stop reason: SDUPTHER

## 2021-12-28 RX ORDER — SODIUM CHLORIDE, SODIUM LACTATE, POTASSIUM CHLORIDE, CALCIUM CHLORIDE 600; 310; 30; 20 MG/100ML; MG/100ML; MG/100ML; MG/100ML
INJECTION, SOLUTION INTRAVENOUS CONTINUOUS PRN
Status: DISCONTINUED | OUTPATIENT
Start: 2021-12-28 | End: 2021-12-28 | Stop reason: SDUPTHER

## 2021-12-28 RX ORDER — SODIUM CHLORIDE, SODIUM LACTATE, POTASSIUM CHLORIDE, CALCIUM CHLORIDE 600; 310; 30; 20 MG/100ML; MG/100ML; MG/100ML; MG/100ML
INJECTION, SOLUTION INTRAVENOUS CONTINUOUS
Status: DISCONTINUED | OUTPATIENT
Start: 2021-12-28 | End: 2021-12-28 | Stop reason: HOSPADM

## 2021-12-28 RX ADMIN — PROPOFOL 30 MG: 10 INJECTION, EMULSION INTRAVENOUS at 08:13

## 2021-12-28 RX ADMIN — SODIUM CHLORIDE, POTASSIUM CHLORIDE, SODIUM LACTATE AND CALCIUM CHLORIDE: 600; 310; 30; 20 INJECTION, SOLUTION INTRAVENOUS at 07:19

## 2021-12-28 RX ADMIN — PROPOFOL 50 MG: 10 INJECTION, EMULSION INTRAVENOUS at 08:11

## 2021-12-28 RX ADMIN — PROPOFOL 30 MG: 10 INJECTION, EMULSION INTRAVENOUS at 08:14

## 2021-12-28 RX ADMIN — PHENYLEPHRINE HYDROCHLORIDE 100 MCG: 10 INJECTION INTRAVENOUS at 08:19

## 2021-12-28 RX ADMIN — PROPOFOL 20 MG: 10 INJECTION, EMULSION INTRAVENOUS at 08:15

## 2021-12-28 RX ADMIN — PROPOFOL 50 MG: 10 INJECTION, EMULSION INTRAVENOUS at 08:12

## 2021-12-28 RX ADMIN — SODIUM CHLORIDE, POTASSIUM CHLORIDE, SODIUM LACTATE AND CALCIUM CHLORIDE: 600; 310; 30; 20 INJECTION, SOLUTION INTRAVENOUS at 08:03

## 2021-12-28 ASSESSMENT — PAIN DESCRIPTION - DESCRIPTORS: DESCRIPTORS: DISCOMFORT

## 2021-12-28 ASSESSMENT — PAIN - FUNCTIONAL ASSESSMENT: PAIN_FUNCTIONAL_ASSESSMENT: 0-10

## 2021-12-28 NOTE — OP NOTE
SURGEON: Leoncio Silver M.D. PREOPERATIVE DIAGNOSES:  Dysphagia s/p HHR     POSTOPERATIVE DIAGNOSES: tight gE     OPERATION: Pnpxyqlh-kqfsiz-hibfvgezpwql with balloon dilation    BLOOD LOSS: 0ML    ANESTHESIA: LMAC    COMPLICATIONS: None. OPERATIONS: The patient was placed on the table in left lateral decubitus position and sedated. Bite block was placed. A lubricated scope was easily passed into the upper esophagus which looked normaln. The distal esophagus looked normal but the GE junction was tight so it was stretched for 1 min with the CRE balloon to 20. The scope was passed into the stomach and retroflexed. There was no hiatal hernia. The scope was passed down toward the pylorus. The antral mucosa all looked normal.  The scope was then passed through the pylorus into the duodenal bulb which looked normal, then around to the distal duodenum which looked normal, and the scope was then withdrawn. The patient tolerated the procedure well.      Physician Signature: Electronically signed by Dr. Bety Cheema

## 2021-12-28 NOTE — H&P
General Surgery History and Physical    Patient's Name/Date of Birth: Jin Self / 1975    Date: December 28, 2021     Surgeon: More Moon M.D.    PCP: Michelle Hollis DO     Chief Complaint: dysphagia    HPI:   Jin Self is a 40 y.o. female who presents for evaluation of dysphagia. Timing is constant, same with solids than liquids. Past Medical History:   Diagnosis Date    Anxiety     Asthma     Carotid stenosis, right 05/30/2018    30-40%    Chest pain 6/21/2018    Complex regional pain syndrome 2018    [facility] EXT; [facility]    CRPS (complex regional pain syndrome type II)     Depression     Discoloration of skin of hand 6/8/2017    Environmental allergies     Fibromyalgia     GERD (gastroesophageal reflux disease)     Spinal stenosis     Sprained ankle     Weakness of right arm 6/8/2017       Past Surgical History:   Procedure Laterality Date    COLONOSCOPY  2013? Negative findings    ENDOSCOPY, COLON, DIAGNOSTIC      ESOPHAGEAL MOTILITY STUDY N/A 2/12/2021    ESOPHAGEAL MOTILITY/MANOMETRY STUDY performed by Gigi Smith DO at [facility] [address] N/A 3/5/2021    LAPAROSCOPIC HIATAL HERNIA REPAIR WITH MESH performed by Olga Perla MD at [facility]  2013?     GERD    UPPER GASTROINTESTINAL ENDOSCOPY N/A 2/12/2021    EGD BIOPSY performed by Olga Perla MD at [address] [place] [address] 7/27/2021    EGD BIOPSY performed by Olga Perla MD at [address] [place] [address]  7/27/2021    EGD DILATION BALLOON performed by Olga Perla MD at [address]         Current Facility-Administered Medications   Medication Dose Route Frequency Provider Last Rate Last Admin    lactated ringers infusion   IntraVENous Continuous Antoni Enriquez DO 75 mL/hr at 12/28/21 0719 New Bag at 12/28/21 0719       Allergies   Allergen Reactions    Hydroxyzine     Amoxicillin-Pot Clavulanate      Tremors in legs    Cefdinir      Tremors in legs    Duloxetine Other (See Comments)     Hands go numb    Gabapentin Other (See Comments)     hallucinations    Naltrexone     Nitrofuran Derivatives     Nuts [Peanut-Containing Drug Products]     Omeprazole     Phenazopyridine     Prednisone Other (See Comments)     \"space out\"    Ranitidine Other (See Comments)     Leg Tremors    Reglan [Metoclopramide]     Sulfamethizole     Tramadol Nausea Only    Venlafaxine        The patient has a family history that is negative for severe cardiovascular or respiratory issues, negative for reaction to anesthesia. Social History     Socioeconomic History    Marital status:      Spouse name: Not on file    Number of children: Not on file    Years of education: Not on file    Highest education level: Not on file   Occupational History    Not on file   Tobacco Use    Smoking status: Former Smoker     Types: Cigarettes     Start date: 200     Quit date: 2017     Years since quittin.2    Smokeless tobacco: Never Used   Vaping Use    Vaping Use: Never used   Substance and Sexual Activity    Alcohol use: No    Drug use: No    Sexual activity: Not Currently   Other Topics Concern    Not on file   Social History Narrative    Not on file     Social Determinants of Health     Financial Resource Strain:     Difficulty of Paying Living Expenses: Not on file   Food Insecurity:     Worried About Running Out of Food in the Last Year: Not on file    Arin of Food in the Last Year: Not on file   Transportation Needs:     Lack of Transportation (Medical): Not on file    Lack of Transportation (Non-Medical):  Not on file   Physical Activity:     Days of Exercise per Week: Not on file    Minutes of Exercise per Session: Not on file   Stress:     Feeling of Stress : Not on file   Social Connections:  Frequency of Communication with Friends and Family: Not on file    Frequency of Social Gatherings with Friends and Family: Not on file    Attends [employer] Services: Not on file    Active Member of Clubs or Organizations: Not on file    Attends Club or Organization Meetings: Not on file    Marital Status: Not on file   Intimate Partner Violence:     Fear of Current or Ex-Partner: Not on file    Emotionally Abused: Not on file    Physically Abused: Not on file    Sexually Abused: Not on file   Housing Stability:     Unable to Pay for Housing in the Last Year: Not on file    Number of [place] in the Last Year: Not on file    Unstable Housing in the Last Year: Not on file           Review of Systems  Review of Systems -  General ROS: negative for - chills, fatigue or malaise  ENT ROS: negative for - hearing change, nasal congestion or nasal discharge  Allergy and Immunology ROS: negative for - hives, itchy/watery eyes or nasal congestion  Hematological and Lymphatic ROS: negative for - blood clots, blood transfusions, bruising or fatigue  Endocrine ROS: negative for - malaise/lethargy, mood swings, palpitations or polydipsia/polyuria  Breast ROS: negative for - new or changing breast lumps or nipple changes  Respiratory ROS: negative for - sputum changes, stridor, tachypnea or wheezing  Cardiovascular ROS: negative for - irregular heartbeat, loss of consciousness, murmur or orthopnea  Gastrointestinal ROS: negative for - constipation, diarrhea, gas/bloating, heartburn or hematemesis, positive for dysphagia   Genito-Urinary ROS: negative for -  genital discharge, genital ulcers or hematuria  Musculoskeletal ROS: negative for - gait disturbance, muscle pain or muscular weakness    Physical exam:   /85   Pulse 93   Temp 98.8 °F (37.1 °C) (Oral)   Resp 16   Ht 5' 7\" (1.702 m)   Wt 126 lb (57.2 kg)   LMP 12/15/2021   SpO2 98%   BMI 19.73 kg/m²   General appearance:  NAD  Pyscho/social: negative for tremors and hallucinations  Head: NCAT, PERRLA, EOMI, red conjunctiva  Neck: supple, no masses  Lungs: CTAB, equal chest rise bilateral  Heart: Reg rate  Abdomen: soft, nontender, nondistended  Skin; no lesions  Gu: no cva tenderness  Extremities: extremities normal, atraumatic, no cyanosis or edema    Assessment:  40 y.o. female with dysphagia     Plan:   EGD    Demetrio Low MD  7:34 AM  12/28/2021

## 2021-12-28 NOTE — ANESTHESIA PRE PROCEDURE
Department of Anesthesiology  Preprocedure Note       Name:  Radha Larkin   Age:  40 y.o.  :  1975                                          MRN:  [id number]         Date:  2021      Surgeon: Evens Query):  Ayanna Pritchett MD    Procedure: Procedure(s):  EGD ESOPHAGOGASTRODUODENOSCOPY WITH [id number])    Medications prior to admission:   Prior to Admission medications    Medication Sig Start Date End Date Taking? Authorizing Provider   vitamin D 25 MCG (1000 UT) CAPS Take by mouth    Historical Provider, MD   diclofenac sodium (VOLTAREN) 1 % GEL Diclofenac Sodium 1 % External Gel  Quantity: 100   Refills: 0  Start: 4-Oct-2020 10/4/20   Historical Provider, MD   promethazine (PHENERGAN) 25 MG tablet Take by mouth 20   Historical Provider, MD   diphenhydrAMINE HCl (BENADRYL PO) Take 25 mg by mouth 2 times daily allergies    Historical Provider, MD   albuterol sulfate  (90 Base) MCG/ACT inhaler Inhale 2 puffs into the lungs every 4 hours as needed  21   Historical Provider, MD   ibuprofen (ADVIL;MOTRIN) 100 MG/5ML suspension Take 400 mg by mouth every 4 hours as needed  21   Historical Provider, MD   sertraline (ZOLOFT) 20 MG/ML concentrated solution Take 20 mg by mouth nightly     Historical Provider, MD       Current medications:    No current facility-administered medications for this visit. No current outpatient medications on file. Facility-Administered Medications Ordered in Other Visits   Medication Dose Route Frequency Provider Last Rate Last Admin    lactated ringers infusion   IntraVENous Continuous Antoni Enriquez DO           Allergies:     Allergies   Allergen Reactions    Hydroxyzine     Amoxicillin-Pot Clavulanate      Tremors in legs    Cefdinir      Tremors in legs    Duloxetine Other (See Comments)     Hands go numb    Gabapentin Other (See Comments)     hallucinations    Naltrexone     Nitrofuran Derivatives     Nuts [Peanut-Containing Drug Products]     Omeprazole     Phenazopyridine     Prednisone Other (See Comments)     \"space out\"    Ranitidine Other (See Comments)     Leg Tremors    Reglan [Metoclopramide]     Sulfamethizole     Tramadol Nausea Only    Venlafaxine        Problem List:    Patient Active Problem List   Diagnosis Code    Pain, arm, right M79.601    Discoloration of skin of hand L81.9    Weakness of right arm R29.898    Complex regional pain syndrome [id number]    Carotid stenosis, right I65.21    Chest pain R07.9    Gastroesophageal reflux disease without esophagitis K21.9    Hiatal hernia K44.9    Pharyngoesophageal dysphagia R13.14       Past Medical History:        Diagnosis Date    Anxiety     Asthma     Carotid stenosis, right 05/30/2018    30-40%    Chest pain 6/21/2018    Complex regional pain syndrome 2018    RIGHT UPPER EXT; [facility]    CRPS (complex regional pain syndrome type II)     Depression     Discoloration of skin of hand 6/8/2017    Environmental allergies     Fibromyalgia     GERD (gastroesophageal reflux disease)     Spinal stenosis     Sprained ankle     Weakness of right arm 6/8/2017       Past Surgical History:        Procedure Laterality Date    COLONOSCOPY  2013? Negative findings    ENDOSCOPY, COLON, DIAGNOSTIC      ESOPHAGEAL MOTILITY STUDY N/A 2/12/2021    ESOPHAGEAL MOTILITY/MANOMETRY STUDY performed by Rafita Childress DO at [address] N/A 3/5/2021    LAPAROSCOPIC HIATAL HERNIA REPAIR WITH MESH performed by Angelina Escobedo MD at [facility] 14.  2013?     GERD    UPPER GASTROINTESTINAL ENDOSCOPY N/A 2/12/2021    EGD BIOPSY performed by Angelina Escobedo MD at [address] [place] [address] 7/27/2021    EGD BIOPSY performed by Angelina Escobedo MD at [address] [facility]  7/27/2021    EGD DILATION BALLOON performed by Kendra Lemons Linus Rahman MD at [address] EXTRA[address]         Social History:    Social History     Tobacco Use    Smoking status: Former Smoker     Types: Cigarettes     Start date:      Quit date: 2017     Years since quittin.2    Smokeless tobacco: Never Used   Substance Use Topics    Alcohol use: No                                Counseling given: Not Answered      Vital Signs (Current): There were no vitals filed for this visit. BP Readings from Last 3 Encounters:   21 115/85   21 107/71   21 (!) 105/55       NPO Status:                                                                                 BMI:   Wt Readings from Last 3 Encounters:   21 126 lb (57.2 kg)   21 111 lb (50.3 kg)   21 111 lb 9.6 oz (50.6 kg)     There is no height or weight on file to calculate BMI.    CBC:   Lab Results   Component Value Date    WBC 6.8 2021    RBC 4.04 2021    HGB 10.6 2021    HCT 34.8 2021    MCV 86.1 2021    RDW 13.5 2021     2021       CMP:   Lab Results   Component Value Date     2021    K 4.0 2021    K 4.3 2021     2021    CO2 25 2021    BUN 9 2021    CREATININE 0.7 2021    GFRAA >60 2021    LABGLOM >60 2021    GLUCOSE 125 2021    PROT 7.6 2021    CALCIUM 9.6 2021    BILITOT 0.3 2021    ALKPHOS 53 2021    AST 16 2021    ALT 7 2021       POC Tests: No results for input(s): POCGLU, POCNA, POCK, POCCL, POCBUN, POCHEMO, POCHCT in the last 72 hours.     Coags: No results found for: PROTIME, INR, APTT    HCG (If Applicable):   Lab Results   Component Value Date    PREGTESTUR NEGATIVE 2021        ABGs: No results found for: PHART, PO2ART, BFN6EYC, WGN4WSU, BEART, J9QRMKLM     Type & Screen (If Applicable):  No results found for: LABABO, LABRH    Drug/Infectious Status (If Applicable):  No results found for: HIV, HEPCAB    COVID-19 Screening (If Applicable):   Lab Results   Component Value Date    COVID19 Not Detected 05/03/2021    COVID19 Not Detected 03/01/2021    COVID19 Not Detected 02/09/2021           Anesthesia Evaluation  Patient summary reviewed  Airway: Mallampati: II  TM distance: >3 FB   Neck ROM: full  Mouth opening: > = 3 FB Dental: normal exam         Pulmonary: breath sounds clear to auscultation  (+) asthma:                            Cardiovascular:  Exercise tolerance: good (>4 METS),       (-) past MI and CAD    ECG reviewed  Rhythm: regular  Rate: normal  Echocardiogram reviewed                  Neuro/Psych:   (+) neuromuscular disease:, psychiatric history:depression/anxiety             GI/Hepatic/Renal:   (+) hiatal hernia, GERD: no interval change,           Endo/Other: Negative Endo/Other ROS                    Abdominal:         (-) obese       Vascular: Other Findings:               Anesthesia Plan      MAC     ASA 2       Induction: intravenous. MIPS: Prophylactic antiemetics administered. Anesthetic plan and risks discussed with patient and mother. Plan discussed with CRNA.                   Shamir Baker MD   12/28/2021

## 2021-12-28 NOTE — ANESTHESIA POSTPROCEDURE EVALUATION
Department of Anesthesiology  Postprocedure Note    Patient: Adrienne Hodges  MRN: [id number]  YOB: 1975  Date of evaluation: 12/28/2021  Time:  11:05 AM     Procedure Summary     Date: 12/28/21 Room / Location: [address] [place] [address] 01 / [address] [facility]    Anesthesia Start: 0801 Anesthesia Stop: 5192    Procedure: EGD DILATION BALLOON (N/A ) Diagnosis: (DYSPHAGIA)    Surgeons: Emmie Mcbride MD Responsible Provider: Moisés Zamora MD    Anesthesia Type: MAC ASA Status: 2          Anesthesia Type: MAC    Ed Phase I: Ed Score: 10    Ed Phase II: Ed Score: 10    Last vitals: Reviewed and per EMR flowsheets.        Anesthesia Post Evaluation    Patient location during evaluation: PACU  Patient participation: complete - patient participated  Level of consciousness: awake and alert  Airway patency: patent  Nausea & Vomiting: no vomiting and no nausea  Complications: no  Cardiovascular status: hemodynamically stable  Respiratory status: acceptable  Hydration status: stable

## 2022-01-10 ENCOUNTER — OFFICE VISIT (OUTPATIENT)
Dept: SURGERY | Age: 47
End: 2022-01-10
Payer: MEDICAID

## 2022-01-10 VITALS
DIASTOLIC BLOOD PRESSURE: 75 MMHG | TEMPERATURE: 97.5 F | SYSTOLIC BLOOD PRESSURE: 121 MMHG | WEIGHT: 126 LBS | HEIGHT: 67 IN | HEART RATE: 100 BPM | BODY MASS INDEX: 19.78 KG/M2

## 2022-01-10 DIAGNOSIS — R13.14 PHARYNGOESOPHAGEAL DYSPHAGIA: Primary | ICD-10-CM

## 2022-01-10 PROCEDURE — G8427 DOCREV CUR MEDS BY ELIG CLIN: HCPCS | Performed by: SURGERY

## 2022-01-10 PROCEDURE — G8484 FLU IMMUNIZE NO ADMIN: HCPCS | Performed by: SURGERY

## 2022-01-10 PROCEDURE — 99212 OFFICE O/P EST SF 10 MIN: CPT | Performed by: SURGERY

## 2022-01-10 PROCEDURE — G8420 CALC BMI NORM PARAMETERS: HCPCS | Performed by: SURGERY

## 2022-01-10 PROCEDURE — 1036F TOBACCO NON-USER: CPT | Performed by: SURGERY

## 2022-01-10 NOTE — PROGRESS NOTES
Surgery Progress Note            Chief complaint:   Patient Active Problem List   Diagnosis    Pain, arm, right    Discoloration of skin of hand    Weakness of right arm    Complex regional pain syndrome    Carotid stenosis, right    Chest pain    Gastroesophageal reflux disease without esophagitis    Hiatal hernia    Pharyngoesophageal dysphagia       S: doing well    O:   Vitals:    01/10/22 1250   BP: 121/75   Pulse: 100   Temp: 97.5 °F (36.4 °C)     No intake or output data in the 24 hours ending 01/10/22 1259        Labs:  No results for input(s): WBC, HGB, HCT in the last 72 hours. Invalid input(s): PLR  Lab Results   Component Value Date    CREATININE 0.7 05/03/2021    BUN 9 05/03/2021     05/03/2021    K 4.0 05/03/2021     05/03/2021    CO2 25 05/03/2021     No results for input(s): LIPASE, AMYLASE in the last 72 hours. Physical exam:   /75   Pulse 100   Temp 97.5 °F (36.4 °C) (Temporal)   Ht 5' 7\" (1.702 m)   Wt 126 lb (57.2 kg)   LMP 12/15/2021   BMI 19.73 kg/m²   General appearance: NAD  Head: NCAT  Neck: supple, no masses  Lungs: equal chest rise bilateral  Heart: S1S2 present  Abdomen: soft, nontender, nondistended  Skin; no new lesions, incisions clean and intact  Gu: no cva tenderness  Extremities: extremities normal, atraumatic, no cyanosis or edema    A:  Post op egd dilation with out dysphagia but with some pain still    P: follow up as needed.      Venessa Jaramillo MD, MD  1/10/2022

## 2022-08-03 ENCOUNTER — TELEPHONE (OUTPATIENT)
Dept: VASCULAR SURGERY | Age: 47
End: 2022-08-03

## 2022-08-04 ENCOUNTER — OFFICE VISIT (OUTPATIENT)
Dept: VASCULAR SURGERY | Age: 47
End: 2022-08-04
Payer: MEDICAID

## 2022-08-04 DIAGNOSIS — I65.21 CAROTID STENOSIS, RIGHT: Primary | ICD-10-CM

## 2022-08-04 PROBLEM — M79.601 PAIN, ARM, RIGHT: Status: RESOLVED | Noted: 2017-06-08 | Resolved: 2022-08-04

## 2022-08-04 PROBLEM — R07.9 CHEST PAIN: Status: RESOLVED | Noted: 2018-06-21 | Resolved: 2022-08-04

## 2022-08-04 PROCEDURE — G8427 DOCREV CUR MEDS BY ELIG CLIN: HCPCS | Performed by: SURGERY

## 2022-08-04 PROCEDURE — 1036F TOBACCO NON-USER: CPT | Performed by: SURGERY

## 2022-08-04 PROCEDURE — 99214 OFFICE O/P EST MOD 30 MIN: CPT | Performed by: SURGERY

## 2022-08-04 PROCEDURE — G8420 CALC BMI NORM PARAMETERS: HCPCS | Performed by: SURGERY

## 2022-08-04 NOTE — PROGRESS NOTES
Chief Complaint:   Chief Complaint   Patient presents with    Circulatory Problem     Follow up carotid stenosis.          HPI: Patient came to the office, for the evaluation of mild right carotid to 30% based upon the testing done a few years ago, concern, came to the office for further evaluation      Patient denies any focal lateralizing neurological symptoms like loss of speech, vision or loss of function of extremity    Patient however has complex regional pain syndrome causing her some discomfort in the right upper extremity with occasional numbness and pain, also some chest pain, follows up with [facility]    Patient can walk a few blocks , and denies any symptoms of rest pain    Allergies   Allergen Reactions    Hydroxyzine     Amoxicillin-Pot Clavulanate      Tremors in legs    Cefdinir      Tremors in legs    Duloxetine Other (See Comments)     Hands go numb    Gabapentin Other (See Comments)     hallucinations    Naltrexone     Nitrofuran Derivatives     Nuts [Peanut-Containing Drug Products]     Omeprazole     Phenazopyridine     Prednisone Other (See Comments)     \"space out\"    Ranitidine Other (See Comments)     Leg Tremors    Reglan [Metoclopramide]     Sulfamethizole     Topamax [Topiramate] Other (See Comments)     Shaking    Tramadol Nausea Only    Venlafaxine        Current Outpatient Medications   Medication Sig Dispense Refill    vitamin D 25 MCG (1000 UT) CAPS Take by mouth      diclofenac sodium (VOLTAREN) 1 % GEL Diclofenac Sodium 1 % External Gel  Quantity: 100   Refills: 0  Start: 4-Oct-2020      promethazine (PHENERGAN) 25 MG tablet Take by mouth      diphenhydrAMINE HCl (BENADRYL PO) Take 25 mg by mouth 2 times daily allergies      albuterol sulfate  (90 Base) MCG/ACT inhaler Inhale 2 puffs into the lungs every 4 hours as needed       ibuprofen (ADVIL;MOTRIN) 100 MG/5ML suspension Take 400 mg by mouth every 4 hours as needed       sertraline (ZOLOFT) 20 MG/ML concentrated solution Take 20 mg by mouth nightly       No current facility-administered medications for this visit. Past Medical History:   Diagnosis Date    Anxiety     Asthma     Carotid stenosis, right 2018    30-40%    Chest pain 2018    Complex regional pain syndrome 2018    RIGHT UPPER EXT; [address]    CRPS (complex regional pain syndrome type II)     Depression     Discoloration of skin of hand 2017    Dry eyes     Environmental allergies     Fibromyalgia     GERD (gastroesophageal reflux disease)     Spinal stenosis     Sprained ankle     Weakness of right arm 2017       Past Surgical History:   Procedure Laterality Date    COLONOSCOPY  ? Negative findings    ENDOSCOPY, COLON, DIAGNOSTIC      ESOPHAGEAL MOTILITY STUDY N/A 2021    ESOPHAGEAL MOTILITY/MANOMETRY STUDY performed by Alonzo Dunham DO at [address] [place] [address] Drive N/A 3/5/2021    LAPAROSCOPIC HIATAL HERNIA REPAIR WITH MESH performed by Lory Saini MD at [address] [facility] [facility] [address]  ?     GERD    UPPER GASTROINTESTINAL ENDOSCOPY N/A 2021    EGD BIOPSY performed by Lory Saini MD at [address] [place] [address] N/A 2021    EGD BIOPSY performed by Lory Saini MD at [address] [place] [address]  2021    EGD DILATION BALLOON performed by Lory Saini MD at [address] [place] [address] N/A 2021    EGD DILATION BALLOON performed by Lory Saini MD at [facility] [address] EXTRACTION         Family History   Problem Relation Age of Onset    Heart Failure Mother     Elevated Lipids Mother     Heart Disease Father          age 77, cardiomyopathy       Social History     Socioeconomic History    Marital status:      Spouse name: Not on file    Number of children: Not on file    Years of education: Not on file    Highest education level: Not 4=aneurysmal             Other pertinent information:1. The past medical records were reviewed. Assessment:    1. Carotid stenosis, right              Plan:       Discussed with the patient, informed her that I have personally reviewed the last carotid ultrasound, as the patient is somewhat apprehensive regarding the carotid [place], recommended follow-up carotid ultrasound but appointment in 3 years but call anytime and come to hospital any strokelike symptoms, clearly explained              Patient was instructed to continue walking program and to call if any worsening of symptoms and to call if any focal lateralizing neurological symptoms like loss of speech, vision or loss of function of extremity. All the questions were answered. Orders Placed This Encounter   Procedures    US CAROTID ARTERY BILATERAL             Indicated follow-up: Return in about 3 years (around 8/4/2025), or if symptoms worsen or fail to improve.

## 2022-08-10 ENCOUNTER — TELEPHONE (OUTPATIENT)
Dept: VASCULAR SURGERY | Age: 47
End: 2022-08-10

## 2022-08-11 ENCOUNTER — HOSPITAL ENCOUNTER (OUTPATIENT)
Dept: CARDIOLOGY | Age: 47
Discharge: HOME | End: 2022-08-11
Payer: MEDICAID

## 2022-08-11 DIAGNOSIS — I65.21 CAROTID STENOSIS, RIGHT: ICD-10-CM

## 2022-08-11 PROCEDURE — 93880 EXTRACRANIAL BILAT STUDY: CPT

## 2022-08-11 NOTE — PROGRESS NOTES
[facility] - [facility]    This is a pre read worksheet - prior to official physician interpretation    Rafael Mcneill  1975  Date of study: 8/11/22    Indication for study:  Carotid artery stenosis  Study : Bilateral Carotid Artery Duplex Examination    Duplex examination of the RIGHT carotid artery system identifies atherosclerotic plaque. The peak systolic velocity in internal carotid artery was 138 centimeters / second. The maximum end diastolic velocity was 44 centimeters / second. The ICA/CCA ratio is 1.1. The right vertebral artery has antegrade flow. Duplex examination of the LEFT carotid artery system identifies atherosclerotic plaque. The peak systolic velocity in internal carotid artery was 108 centimeters / second. The maximum end diastolic velocity was 42 centimeters / second. The ICA/CCA ratio is 0.84. The left vertebral artery has antegrade flow.     RIGHT 30%  LEFT 20%      LAST STUDY  7/21/2020  Rt 30  Lt 20

## 2022-08-15 ENCOUNTER — TELEPHONE (OUTPATIENT)
Dept: VASCULAR SURGERY | Age: 47
End: 2022-08-15

## 2022-08-15 NOTE — PROCEDURES
[address]                  [address] [facility],  [facility]                                VASCULAR REPORT    PATIENT NAME: Julieta Hurtado                    :        1975  MED REC NO:   [id number]                            ROOM:  ACCOUNT NO:   [de-identified]                           ADMIT DATE: 2022  PROVIDER:     Cherry Fernandez MD    DATE OF PROCEDURE:  2022    ULTRASOUND OF CAROTID ARTERY    INDICATION:  History of right carotid stenosis two years ago. FINDINGS:  Duplex scanning of the right carotid artery revealed the  patient does have moderate intimal thickening with a peak internal  carotid velocity of 506, diastolic velocity of 45 cm/second with a  plaque causing 30% stenosis. Duplex scanning of the left carotid artery revealed mild intimal  thickening with a peak internal carotid velocity of 812, diastolic  velocity of 42 cm per/second with 20% stenosis only. IMPRESSION:  Mild right carotid artery stenosis of 30% with normal study  on the left, unchanged from two years ago.         Hali Parada MD    D: 2022 15:12:02       T: 2022 15:14:21     [id number]  Job#: [id number]     Doc#: [id number]    CC:  Emil Oneill DO

## 2022-08-15 NOTE — TELEPHONE ENCOUNTER
Called and informed patient of ultrasound of carotids 30% stenosis on right, 20% on left, not to worry, keep 3 year appointment.

## 2023-03-01 ENCOUNTER — HOSPITAL ENCOUNTER (OUTPATIENT)
Age: 48
Discharge: HOME | End: 2023-03-01

## 2023-03-04 LAB
IGE: 24 KU/L
Lab: NORMAL
REPORT: NORMAL
THIS TEST SENT TO: NORMAL

## 2023-03-20 ENCOUNTER — HOSPITAL ENCOUNTER (EMERGENCY)
Age: 48
Discharge: HOME | End: 2023-03-20
Payer: MEDICAID

## 2023-03-20 VITALS
SYSTOLIC BLOOD PRESSURE: 122 MMHG | TEMPERATURE: 97.5 F | HEART RATE: 90 BPM | RESPIRATION RATE: 16 BRPM | OXYGEN SATURATION: 99 % | DIASTOLIC BLOOD PRESSURE: 80 MMHG

## 2023-03-20 DIAGNOSIS — R60.0 PERIORBITAL EDEMA OF LEFT EYE: Primary | ICD-10-CM

## 2023-03-20 PROCEDURE — 99283 EMERGENCY DEPT VISIT LOW MDM: CPT

## 2023-03-20 RX ORDER — FEXOFENADINE HCL 180 MG/1
180 TABLET ORAL DAILY
Qty: 30 TABLET | Refills: 0 | Status: SHIPPED | OUTPATIENT
Start: 2023-03-20 | End: 2023-04-19

## 2023-03-20 RX ORDER — ERYTHROMYCIN 5 MG/G
OINTMENT OPHTHALMIC
Qty: 3.5 G | Refills: 0 | Status: SHIPPED | OUTPATIENT
Start: 2023-03-20

## 2023-03-20 NOTE — ED PROVIDER NOTES
good.    Discharge Instructions:   Patient referred to  Arcelia Brady DO  [address] [facility]  [phone]    Call today  for follow-up on ED visit    Eye doctor    Call   for follow-up on ED visit    Jey Rodriguez MD  [address] [place] [address], [address] [place] [address] [phone]    Call in 1 day  for follow-up on ED visit      Electronically signed by Kat Hallman PA-C   DD: 3/20/23  I am the Primary Clinician of Record. **This report was transcribed using voice recognition software. Every effort was made to ensure accuracy; however, inadvertent computerized transcription errors may be present.     END OF PROVIDER NOTE        Kat Hallman PA-C  03/20/23 [id number]

## 2025-07-11 DIAGNOSIS — I65.21 CAROTID STENOSIS, RIGHT: Primary | ICD-10-CM

## 2025-07-31 ENCOUNTER — OFFICE VISIT (OUTPATIENT)
Dept: VASCULAR SURGERY | Age: 50
End: 2025-07-31
Payer: MEDICAID

## 2025-07-31 DIAGNOSIS — I65.21 CAROTID STENOSIS, RIGHT: Primary | ICD-10-CM

## 2025-07-31 PROCEDURE — 99214 OFFICE O/P EST MOD 30 MIN: CPT | Performed by: SURGERY

## 2025-07-31 PROCEDURE — G8427 DOCREV CUR MEDS BY ELIG CLIN: HCPCS | Performed by: SURGERY

## 2025-07-31 PROCEDURE — 4004F PT TOBACCO SCREEN RCVD TLK: CPT | Performed by: SURGERY

## 2025-07-31 PROCEDURE — G8421 BMI NOT CALCULATED: HCPCS | Performed by: SURGERY

## 2025-07-31 RX ORDER — CYCLOSPORINE OPHTHALMIC SOLUTION 1 MG/ML
SOLUTION/ DROPS OPHTHALMIC
COMMUNITY
Start: 2025-07-21

## 2025-07-31 RX ORDER — ROSUVASTATIN CALCIUM 10 MG/1
10 TABLET, COATED ORAL DAILY
COMMUNITY
Start: 2025-05-19

## 2025-07-31 RX ORDER — DIPHENHYDRAMINE HYDROCHLORIDE, ZINC ACETATE 2; .1 G/100G; G/100G
CREAM TOPICAL
COMMUNITY
End: 2025-07-31 | Stop reason: ALTCHOICE

## 2025-07-31 RX ORDER — PERFLUOROHEXYLOCTANE 1 MG/MG
SOLUTION OPHTHALMIC
COMMUNITY
Start: 2025-07-07

## 2025-07-31 RX ORDER — BEPOTASTINE BESILATE 15 MG/ML
SOLUTION/ DROPS OPHTHALMIC
COMMUNITY
Start: 2025-05-13

## 2025-07-31 NOTE — PROGRESS NOTES
Chief Complaint:   Chief Complaint   Patient presents with    Follow-up     krunal         HPI: Patient came to the office after 3 years, for the evaluation of mild right carotid stenosis 30% based upon the workup done in the past    Patient always had some right arm weakness due to complex regional pain syndrome, unchanged follows up with [facility]    Patient recently underwent surgery for a hiatal hernia      Patient denies any focal lateralizing neurological symptoms like loss of speech, vision or loss of function of extremity    Patient can walk a few blocks with difficulty, and denies any symptoms of rest pain    No changes in her health system    Allergies   Allergen Reactions    Hydroxyzine     Amoxicillin-Pot Clavulanate      Tremors in legs    Cefdinir      Tremors in legs    Duloxetine Other (See Comments)     Hands go numb    Gabapentin Other (See Comments)     hallucinations    Naltrexone     Nitrofuran Derivatives     Nuts [Peanut-Containing Drug Products]     Omeprazole     Phenazopyridine     Prednisone Other (See Comments)     \"space out\"    Ranitidine Other (See Comments)     Leg Tremors    Reglan [Metoclopramide]     Sulfamethizole     Topamax [Topiramate] Other (See Comments)     Shaking    Tramadol Nausea Only    Venlafaxine        Current Outpatient Medications   Medication Sig Dispense Refill    BEPREVE 1.5 % SOLN INSTILL 1 DROP IN BOTH EYES TWICE DAILY      MIEBO 1.338 GM/ML SOLN INSTILL 1 DROP INTO BOTH EYES EVERY 6 HOURS      VEVYE 0.1 % SOLN       rosuvastatin (CRESTOR) 10 MG tablet Take 1 tablet by mouth daily      vitamin D 25 MCG (1000 UT) CAPS Take by mouth      albuterol sulfate  (90 Base) MCG/ACT inhaler Inhale 2 puffs into the lungs every 4 hours as needed      sertraline (ZOLOFT) 20 MG/ML concentrated solution Take 1 mL by mouth nightly       No current facility-administered medications for this visit.       Past Medical History:   Diagnosis Date    Anxiety     Asthma

## (undated) DEVICE — BLOCK BITE 60FR CAREGUARD

## (undated) DEVICE — TROCAR: Brand: KII SLEEVE

## (undated) DEVICE — KIT BEDSIDE REVITAL OX 500ML

## (undated) DEVICE — CONTAINER SPEC 480ML CLR POLYSTYR 10% NEUT BUFF FRMLN ZN

## (undated) DEVICE — NEEDLE HYPO 25GA L1.5IN BLU POLYPR HUB S STL REG BVL STR

## (undated) DEVICE — KENDALL 450 SERIES MONITORING FOAM ELECTRODE - RECTANGULAR SHAPE ( 3/PK): Brand: KENDALL

## (undated) DEVICE — MEDI-VAC NON-CONDUCTIVE SUCTION TUBING: Brand: CARDINAL HEALTH

## (undated) DEVICE — TROCAR: Brand: KII FIOS FIRST ENTRY

## (undated) DEVICE — MASK,FACE,MAXFLUIDPROTECT,SHIELD/ERLPS: Brand: MEDLINE

## (undated) DEVICE — CONTAINER SPEC COLL 960ML POLYPR TRIANG GRAD INTAKE/OUTPUT

## (undated) DEVICE — SPONGE GZ 4IN 4IN 4 PLY N WVN AVANT

## (undated) DEVICE — 6 X 9  1.75MIL 4-WALL LABGUARD: Brand: MINIGRIP COMMERCIAL LLC

## (undated) DEVICE — MARKER,SKIN,WI/RULER AND LABELS: Brand: MEDLINE

## (undated) DEVICE — GLOVE ORANGE PI 7 1/2   MSG9075

## (undated) DEVICE — GOWN ISOLATN REG YEL M WT MULTIPLY SIDETIE LEV 2

## (undated) DEVICE — LUBRICANT SURG JELLY ST BACTER TUBE 4.25OZ

## (undated) DEVICE — SYRINGE INFL 60ML DISP ALLIANCE II

## (undated) DEVICE — COVER,LIGHT HANDLE,FLX,1/PK: Brand: MEDLINE INDUSTRIES, INC.

## (undated) DEVICE — PACK SURG LAP CHOLE CUSTOM

## (undated) DEVICE — VALVE SUCTION AIR H2O HYDR H2O JET CONN STRL ORCA POD + DISP

## (undated) DEVICE — MEDI-VAC YANKAUER SUCTION HANDLE W/BULBOUS TIP: Brand: CARDINAL HEALTH

## (undated) DEVICE — CAMERA STRYKER 1488 HD GEN

## (undated) DEVICE — DOUBLE BASIN SET: Brand: MEDLINE INDUSTRIES, INC.

## (undated) DEVICE — AGENT HEMSTAT W2XL4IN OXIDIZED REGENERATED CELOS ABSRB

## (undated) DEVICE — FORCEPS BX L240CM JAW DIA2.8MM L CAP W/ NDL MIC MESH TOOTH

## (undated) DEVICE — INSUFFLATION NEEDLE TO ESTABLISH PNEUMOPERITONEUM.: Brand: INSUFFLATION NEEDLE

## (undated) DEVICE — APPLICATOR MEDICATED 26 CC SOLUTION HI LT ORNG CHLORAPREP

## (undated) DEVICE — Device

## (undated) DEVICE — ESOPHAGEAL/COLONIC/BILIARY WIREGUIDED BALLOON DILATATION CATHETER: Brand: CRE™ PRO

## (undated) DEVICE — LAPAROSCOPIC SCISSORS: Brand: EPIX LAPAROSCOPIC SCISSORS

## (undated) DEVICE — AGENT HEMSTAT W4XL4IN OXIDIZED REGENERATED CELOS STRUCTURED

## (undated) DEVICE — PLUMEPORT LAPAROSCOPIC SMOKE FILTRATION DEVICE: Brand: PLUMEPORT ACTIV

## (undated) DEVICE — ELECTRODE PT RET AD L9FT HI MOIST COND ADH HYDRGEL CORDED

## (undated) DEVICE — SUTURE DEV SZ 0 L6IN N ABSORBABLE

## (undated) DEVICE — SET ENDO INSTR LAPAROSCOPIC INCISIONAL

## (undated) DEVICE — GARMENT,MEDLINE,DVT,INT,CALF,MED, GEN2: Brand: MEDLINE

## (undated) DEVICE — [HIGH FLOW INSUFFLATOR,  DO NOT USE IF PACKAGE IS DAMAGED,  KEEP DRY,  KEEP AWAY FROM SUNLIGHT,  PROTECT FROM HEAT AND RADIOACTIVE SOURCES.]: Brand: PNEUMOSURE

## (undated) DEVICE — PMI PTFE COATED LAPAROSCOPIC WIRE L-HOOK 44 CM: Brand: PMI

## (undated) DEVICE — SYRINGE MED 10ML TRNSLUC BRL PLUNG BLK MRK POLYPR CTRL

## (undated) DEVICE — TUBING, SUCTION, 1/4" X 10', STRAIGHT: Brand: MEDLINE

## (undated) DEVICE — TOWEL,OR,DSP,ST,BLUE,STD,6/PK,12PK/CS: Brand: MEDLINE

## (undated) DEVICE — ETHICON LINX ESOPHAGUS SIZING TOOL: Brand: ETHICON LINX ESOPHAGUS SIZING TOOL

## (undated) DEVICE — GLOVE ORANGE PI 8   MSG9080

## (undated) DEVICE — SUTURE ABSRB L6IN L37MM 0 GS-21 GRN 1/2 CIR TAPR PNT NDL VLOCL0306

## (undated) DEVICE — GOWN,SIRUS,NONRNF,SETINSLV,XL,20/CS: Brand: MEDLINE